# Patient Record
Sex: FEMALE | Race: BLACK OR AFRICAN AMERICAN | NOT HISPANIC OR LATINO | Employment: OTHER | URBAN - METROPOLITAN AREA
[De-identification: names, ages, dates, MRNs, and addresses within clinical notes are randomized per-mention and may not be internally consistent; named-entity substitution may affect disease eponyms.]

---

## 2017-11-02 ENCOUNTER — TELEPHONE (OUTPATIENT)
Dept: GASTROENTEROLOGY | Facility: CLINIC | Age: 67
End: 2017-11-02

## 2017-11-02 NOTE — TELEPHONE ENCOUNTER
----- Message from Mario Hillman sent at 11/2/2017 11:22 AM CDT -----  Contact: [phone] self  Patient called in requesting to speak with you. Patient prefers to speak with a nurse. Please advise.

## 2018-07-25 ENCOUNTER — CLINICAL SUPPORT (OUTPATIENT)
Dept: LAB | Facility: HOSPITAL | Age: 68
End: 2018-07-25
Attending: INTERNAL MEDICINE
Payer: MEDICARE

## 2018-07-25 DIAGNOSIS — R00.2 PALPITATIONS: ICD-10-CM

## 2018-07-25 DIAGNOSIS — R00.2 PALPITATIONS: Primary | ICD-10-CM

## 2018-07-25 PROCEDURE — 93005 ELECTROCARDIOGRAM TRACING: CPT

## 2018-09-04 DIAGNOSIS — Z12.31 SCREENING MAMMOGRAM, ENCOUNTER FOR: Primary | ICD-10-CM

## 2020-08-25 ENCOUNTER — CLINICAL SUPPORT (OUTPATIENT)
Dept: OTOLARYNGOLOGY | Facility: CLINIC | Age: 70
End: 2020-08-25
Payer: MEDICARE

## 2020-08-25 ENCOUNTER — OFFICE VISIT (OUTPATIENT)
Dept: OTOLARYNGOLOGY | Facility: CLINIC | Age: 70
End: 2020-08-25
Payer: MEDICARE

## 2020-08-25 VITALS
HEIGHT: 65 IN | BODY MASS INDEX: 41.18 KG/M2 | DIASTOLIC BLOOD PRESSURE: 86 MMHG | HEART RATE: 66 BPM | WEIGHT: 247.13 LBS | SYSTOLIC BLOOD PRESSURE: 145 MMHG | TEMPERATURE: 98 F

## 2020-08-25 DIAGNOSIS — H93.12 NEW ONSET TINNITUS, LEFT: ICD-10-CM

## 2020-08-25 PROCEDURE — 92567 TYMPANOMETRY: CPT | Mod: S$GLB,,, | Performed by: AUDIOLOGIST-HEARING AID FITTER

## 2020-08-25 PROCEDURE — 1159F PR MEDICATION LIST DOCUMENTED IN MEDICAL RECORD: ICD-10-PCS | Mod: S$GLB,,, | Performed by: OTOLARYNGOLOGY

## 2020-08-25 PROCEDURE — 3077F PR MOST RECENT SYSTOLIC BLOOD PRESSURE >= 140 MM HG: ICD-10-PCS | Mod: CPTII,S$GLB,, | Performed by: OTOLARYNGOLOGY

## 2020-08-25 PROCEDURE — 92567 PR TYMPA2METRY: ICD-10-PCS | Mod: S$GLB,,, | Performed by: AUDIOLOGIST-HEARING AID FITTER

## 2020-08-25 PROCEDURE — 3079F PR MOST RECENT DIASTOLIC BLOOD PRESSURE 80-89 MM HG: ICD-10-PCS | Mod: CPTII,S$GLB,, | Performed by: OTOLARYNGOLOGY

## 2020-08-25 PROCEDURE — 99999 PR PBB SHADOW E&M-EST. PATIENT-LVL IV: ICD-10-PCS | Mod: PBBFAC,,, | Performed by: OTOLARYNGOLOGY

## 2020-08-25 PROCEDURE — 92557 PR COMPREHENSIVE HEARING TEST: ICD-10-PCS | Mod: S$GLB,,, | Performed by: AUDIOLOGIST-HEARING AID FITTER

## 2020-08-25 PROCEDURE — 92557 COMPREHENSIVE HEARING TEST: CPT | Mod: S$GLB,,, | Performed by: AUDIOLOGIST-HEARING AID FITTER

## 2020-08-25 PROCEDURE — 99204 OFFICE O/P NEW MOD 45 MIN: CPT | Mod: S$GLB,,, | Performed by: OTOLARYNGOLOGY

## 2020-08-25 PROCEDURE — 99999 PR PBB SHADOW E&M-EST. PATIENT-LVL I: CPT | Mod: PBBFAC,,, | Performed by: AUDIOLOGIST-HEARING AID FITTER

## 2020-08-25 PROCEDURE — 3079F DIAST BP 80-89 MM HG: CPT | Mod: CPTII,S$GLB,, | Performed by: OTOLARYNGOLOGY

## 2020-08-25 PROCEDURE — 1101F PT FALLS ASSESS-DOCD LE1/YR: CPT | Mod: CPTII,S$GLB,, | Performed by: OTOLARYNGOLOGY

## 2020-08-25 PROCEDURE — 99204 PR OFFICE/OUTPT VISIT, NEW, LEVL IV, 45-59 MIN: ICD-10-PCS | Mod: S$GLB,,, | Performed by: OTOLARYNGOLOGY

## 2020-08-25 PROCEDURE — 1101F PR PT FALLS ASSESS DOC 0-1 FALLS W/OUT INJ PAST YR: ICD-10-PCS | Mod: CPTII,S$GLB,, | Performed by: OTOLARYNGOLOGY

## 2020-08-25 PROCEDURE — 3008F PR BODY MASS INDEX (BMI) DOCUMENTED: ICD-10-PCS | Mod: CPTII,S$GLB,, | Performed by: OTOLARYNGOLOGY

## 2020-08-25 PROCEDURE — 99999 PR PBB SHADOW E&M-EST. PATIENT-LVL IV: CPT | Mod: PBBFAC,,, | Performed by: OTOLARYNGOLOGY

## 2020-08-25 PROCEDURE — 3077F SYST BP >= 140 MM HG: CPT | Mod: CPTII,S$GLB,, | Performed by: OTOLARYNGOLOGY

## 2020-08-25 PROCEDURE — 1126F AMNT PAIN NOTED NONE PRSNT: CPT | Mod: S$GLB,,, | Performed by: OTOLARYNGOLOGY

## 2020-08-25 PROCEDURE — 1159F MED LIST DOCD IN RCRD: CPT | Mod: S$GLB,,, | Performed by: OTOLARYNGOLOGY

## 2020-08-25 PROCEDURE — 3008F BODY MASS INDEX DOCD: CPT | Mod: CPTII,S$GLB,, | Performed by: OTOLARYNGOLOGY

## 2020-08-25 PROCEDURE — 1126F PR PAIN SEVERITY QUANTIFIED, NO PAIN PRESENT: ICD-10-PCS | Mod: S$GLB,,, | Performed by: OTOLARYNGOLOGY

## 2020-08-25 PROCEDURE — 99999 PR PBB SHADOW E&M-EST. PATIENT-LVL I: ICD-10-PCS | Mod: PBBFAC,,, | Performed by: AUDIOLOGIST-HEARING AID FITTER

## 2020-08-25 RX ORDER — PREDNISONE 20 MG/1
TABLET ORAL
Qty: 21 TABLET | Refills: 0 | Status: SHIPPED | OUTPATIENT
Start: 2020-08-25 | End: 2020-12-07

## 2020-08-25 RX ORDER — TEMAZEPAM 15 MG/1
CAPSULE ORAL
COMMUNITY
Start: 2020-07-27 | End: 2020-12-10

## 2020-08-25 RX ORDER — HYDROCHLOROTHIAZIDE 25 MG/1
TABLET ORAL
COMMUNITY
Start: 2020-07-27 | End: 2021-02-11 | Stop reason: SDUPTHER

## 2020-08-25 NOTE — PROGRESS NOTES
Chief Complaint   Patient presents with    Tinnitus     L ear only for 10-14days   .     HPI: Mrs. Jimenez is a very pleasant 70 y.o. female here to see me today for the first time for evaluation of tinnitus.  She reports tinnitus that started suddenly 10day(s) ago.  She notes that it is primarily left. She states that the tinnitus does not interfere with communication, concentration, sleep and enjoyment of life. She denies hearing loss.  She has not noted any difference in hearing between the ears, with both ears being the better hearing ear.  She has not had any recent issues with ear pain or ear drainage.  She denies a family history of hearing loss, and has not had any previous otologic surgery.  She denies any history of significant loud noise exposure.She denies issues with dizziness.    Past Medical History:   Diagnosis Date    High cholesterol     Hypertension      Social History     Socioeconomic History    Marital status:      Spouse name: Not on file    Number of children: Not on file    Years of education: Not on file    Highest education level: Not on file   Occupational History    Not on file   Social Needs    Financial resource strain: Not on file    Food insecurity     Worry: Not on file     Inability: Not on file    Transportation needs     Medical: Not on file     Non-medical: Not on file   Tobacco Use    Smoking status: Never Smoker    Smokeless tobacco: Never Used   Substance and Sexual Activity    Alcohol use: Yes     Alcohol/week: 1.0 standard drinks     Types: 1 Glasses of wine per week     Comment: very seldom     Drug use: No    Sexual activity: Not Currently   Lifestyle    Physical activity     Days per week: Not on file     Minutes per session: Not on file    Stress: Not on file   Relationships    Social connections     Talks on phone: Not on file     Gets together: Not on file     Attends Restorationism service: Not on file     Active member of club or organization:  Not on file     Attends meetings of clubs or organizations: Not on file     Relationship status: Not on file   Other Topics Concern    Not on file   Social History Narrative    Not on file     Past Surgical History:   Procedure Laterality Date    HYSTERECTOMY       History reviewed. No pertinent family history.        Review of Systems  General: negative for chills, fever or weight loss  Psychological: negative for mood changes or depression  Ophthalmic: negative for blurry vision, photophobia or eye pain  ENT: see HPI  Respiratory: no cough, shortness of breath, or wheezing  Cardiovascular: no chest pain or dyspnea on exertion  Gastrointestinal: no abdominal pain, change in bowel habits, or black/ bloody stools  Musculoskeletal: negative for gait disturbance or muscular weakness  Neurological: no syncope or seizures; no ataxia  Dermatological: negative for puritis,  rash and jaundice  Hematologic/lymphatic: no easy bruising, no new lumps or bumps      Physical Exam:    Vitals:    08/25/20 1509   BP: (!) 145/86   Pulse: 66   Temp: 97.8 °F (36.6 °C)       Constitutional: Well appearing / communicating without difficutly.  NAD.  Eyes: EOM I Bilaterally  Head/Face: Normocephalic.  Negative paranasal sinus pressure/tenderness.  Salivary glands WNL.  House Brackmann I Bilaterally.    Right Ear: Auricle normal appearance. External Auditory Canal within normal limits no lesions or masses,TM w/o masses/lesions/perforations. TM mobility noted.   Left Ear: Auricle normal appearance. External Auditory Canal within normal limits no lesions or masses,TM w/o masses/lesions/perforations. TM mobility noted.  Nose: No gross nasal septal deviation. Inferior Turbinates 3+ bilaterally. No septal perforation. No masses/lesions. External nasal skin appears normal without masses/lesions.  Oral Cavity: Gingiva/lips within normal limits.  Dentition/gingiva healthy appearing. Mucus membranes moist. Floor of mouth soft, no masses palpated.  Oral Tongue mobile. Hard Palate appears normal.    Oropharynx: Base of tongue appears normal. No masses/lesions noted. Tonsillar fossa/pharyngeal wall without lesions. Posterior oropharynx WNL.  Soft palate without masses. Midline uvula.   Neck/Lymphatic: No LAD I-VI bilaterally.  No thyromegaly.  No masses noted on exam.    Mirror laryngoscopy/nasopharyngoscopy: Active gag reflex.  Unable to perform.    Neuro/Psychiatric: AOx3.  Normal mood and affect.   Cardiovascular: Normal carotid pulses bilaterally, no increasing jugular venous distention noted at cervical region bilaterally.    Respiratory: Normal respiratory effort, no stridor, no retractions noted.                Assessment:    ICD-10-CM ICD-9-CM    1. Asymmetrical hearing loss of both ears  H91.8X3 389.8    2. New onset tinnitus, left  H93.12 388.30      The primary encounter diagnosis was Asymmetrical hearing loss of both ears. A diagnosis of New onset tinnitus, left was also pertinent to this visit.      Plan:  No orders of the defined types were placed in this encounter.    I explained that the hearing loss may be a manifestation of SSNHL given asymmetry on audiogram. We discussed the relevant pathology and anatomy of sudden sensorineural hearing loss (SSNHL), and that it is usually a viral sequelae.  The current treatment recommendation is prompt treatment with high dose oral steroids, Prednisone 60-40-20-10 over two weeks.  Risks of steroid use, including elevation of blood sugar, were discussed with the patient who expressed understanding.  I will see the patient back in two weeks with a repeat audiogram.      Susan Littlejohn MD

## 2020-09-03 NOTE — PROGRESS NOTES
Chief Complaint   Patient presents with    Follow-up    Tinnitus     off and on since last visit    Dizziness     medicine gave her some dizzy spells and nausea   .     HPI: Mrs. Jimenez is a very pleasant 70 y.o. female here to see me today for the first time for evaluation of tinnitus.  She reports tinnitus that started suddenly 10day(s) ago.  She notes that it is primarily left. She states that the tinnitus does not interfere with communication, concentration, sleep and enjoyment of life. She denies hearing loss.  She has not noted any difference in hearing between the ears, with both ears being the better hearing ear.  She has not had any recent issues with ear pain or ear drainage.  She denies a family history of hearing loss, and has not had any previous otologic surgery.  She denies any history of significant loud noise exposure.She denies issues with dizziness.    Interval HPI 09/03/2020:  Mrs. Jimenez follows up today for acute onset left-sided tinnitus and presumed sudden sensorineural hearing loss.  She states that since last visit she has been taking the prednisone tablets as prescribed.  She feels that the tinnitus is still present but slightly decreased.  She reports that the hearing may be slightly improved.  She denies any otalgia, otorrhea, postauricular pain, or vertigo.    Past Medical History:   Diagnosis Date    High cholesterol     Hypertension      Social History     Socioeconomic History    Marital status:      Spouse name: Not on file    Number of children: Not on file    Years of education: Not on file    Highest education level: Not on file   Occupational History    Not on file   Social Needs    Financial resource strain: Not on file    Food insecurity     Worry: Not on file     Inability: Not on file    Transportation needs     Medical: Not on file     Non-medical: Not on file   Tobacco Use    Smoking status: Never Smoker    Smokeless tobacco: Never Used   Substance  and Sexual Activity    Alcohol use: Yes     Alcohol/week: 1.0 standard drinks     Types: 1 Glasses of wine per week     Comment: very seldom     Drug use: No    Sexual activity: Not Currently   Lifestyle    Physical activity     Days per week: Not on file     Minutes per session: Not on file    Stress: Not on file   Relationships    Social connections     Talks on phone: Not on file     Gets together: Not on file     Attends Mu-ism service: Not on file     Active member of club or organization: Not on file     Attends meetings of clubs or organizations: Not on file     Relationship status: Not on file   Other Topics Concern    Not on file   Social History Narrative    Not on file     Past Surgical History:   Procedure Laterality Date    HYSTERECTOMY       No family history on file.        Review of Systems  General: negative for chills, fever or weight loss  Psychological: negative for mood changes or depression  Ophthalmic: negative for blurry vision, photophobia or eye pain  ENT: see HPI  Respiratory: no cough, shortness of breath, or wheezing  Cardiovascular: no chest pain or dyspnea on exertion  Gastrointestinal: no abdominal pain, change in bowel habits, or black/ bloody stools  Musculoskeletal: negative for gait disturbance or muscular weakness  Neurological: no syncope or seizures; no ataxia  Dermatological: negative for puritis,  rash and jaundice  Hematologic/lymphatic: no easy bruising, no new lumps or bumps      Physical Exam:    Vitals:    09/04/20 1058   BP: (!) 156/89   Pulse: 60   Temp: 97.4 °F (36.3 °C)       Constitutional: Well appearing / communicating without difficutly.  NAD.  Eyes: EOM I Bilaterally  Head/Face: Normocephalic.  Negative paranasal sinus pressure/tenderness.  Salivary glands WNL.  House Brackmann I Bilaterally.    Right Ear: Auricle normal appearance. External Auditory Canal within normal limits no lesions or masses,TM w/o masses/lesions/perforations. TM mobility  noted.   Left Ear: Auricle normal appearance. External Auditory Canal within normal limits no lesions or masses,TM w/o masses/lesions/perforations. TM mobility noted.  Nose: No gross nasal septal deviation. Inferior Turbinates 3+ bilaterally. No septal perforation. No masses/lesions. External nasal skin appears normal without masses/lesions.  Oral Cavity: Gingiva/lips within normal limits.  Dentition/gingiva healthy appearing. Mucus membranes moist. Floor of mouth soft, no masses palpated. Oral Tongue mobile. Hard Palate appears normal.    Oropharynx: Base of tongue appears normal. No masses/lesions noted. Tonsillar fossa/pharyngeal wall without lesions. Posterior oropharynx WNL.  Soft palate without masses. Midline uvula.   Neck/Lymphatic: No LAD I-VI bilaterally.  No thyromegaly.  No masses noted on exam.    Mirror laryngoscopy/nasopharyngoscopy: Active gag reflex.  Unable to perform.    Neuro/Psychiatric: AOx3.  Normal mood and affect.   Cardiovascular: Normal carotid pulses bilaterally, no increasing jugular venous distention noted at cervical region bilaterally.    Respiratory: Normal respiratory effort, no stridor, no retractions noted.      Diagnostic testing reviewed:   Audiogram reviewed personally by myself and in detail with the patient today.                 Assessment:    ICD-10-CM ICD-9-CM    1. Asymmetrical hearing loss of both ears  H91.8X3 389.8    2. New onset tinnitus, left  H93.12 388.30      The primary encounter diagnosis was Asymmetrical hearing loss of both ears. A diagnosis of New onset tinnitus, left was also pertinent to this visit.      Plan:  No orders of the defined types were placed in this encounter.    Repeat audiogram today shows improvement in all frequencies.  Her threshold levels seem to be back to the level of the right ear with the exception of 4000 and 8000 hz.   Reassurance provided that she did receive benefit from PO steroid regimen. She is still concerned about the  tinnitus that is more prominent in her left ear.   We also discussed that tinnitus is most often caused by a hearing loss, and that as the hair cells are damaged, either genetic or as a result of loud noise exposure, they then cause tinnitus.  Some patients find that restricting the salt or caffeine in their diet helps.  Tinnitus tends to be louder in times of stress and fatigue, and may decrease with time.  Sound machines may also be an effective masking technique if needed at night.      Susan Littlejohn MD

## 2020-09-04 ENCOUNTER — CLINICAL SUPPORT (OUTPATIENT)
Dept: OTOLARYNGOLOGY | Facility: CLINIC | Age: 70
End: 2020-09-04
Payer: MEDICARE

## 2020-09-04 ENCOUNTER — OFFICE VISIT (OUTPATIENT)
Dept: OTOLARYNGOLOGY | Facility: CLINIC | Age: 70
End: 2020-09-04
Payer: MEDICARE

## 2020-09-04 VITALS
WEIGHT: 247.44 LBS | HEART RATE: 60 BPM | BODY MASS INDEX: 41.23 KG/M2 | HEIGHT: 65 IN | TEMPERATURE: 97 F | SYSTOLIC BLOOD PRESSURE: 156 MMHG | DIASTOLIC BLOOD PRESSURE: 89 MMHG

## 2020-09-04 DIAGNOSIS — H93.12 NEW ONSET TINNITUS, LEFT: ICD-10-CM

## 2020-09-04 PROCEDURE — 1126F PR PAIN SEVERITY QUANTIFIED, NO PAIN PRESENT: ICD-10-PCS | Mod: S$GLB,,, | Performed by: OTOLARYNGOLOGY

## 2020-09-04 PROCEDURE — 1101F PT FALLS ASSESS-DOCD LE1/YR: CPT | Mod: CPTII,S$GLB,, | Performed by: OTOLARYNGOLOGY

## 2020-09-04 PROCEDURE — 99213 OFFICE O/P EST LOW 20 MIN: CPT | Mod: S$GLB,,, | Performed by: OTOLARYNGOLOGY

## 2020-09-04 PROCEDURE — 99999 PR PBB SHADOW E&M-EST. PATIENT-LVL I: CPT | Mod: PBBFAC,,, | Performed by: AUDIOLOGIST-HEARING AID FITTER

## 2020-09-04 PROCEDURE — 99999 PR PBB SHADOW E&M-EST. PATIENT-LVL III: ICD-10-PCS | Mod: PBBFAC,,, | Performed by: OTOLARYNGOLOGY

## 2020-09-04 PROCEDURE — 1126F AMNT PAIN NOTED NONE PRSNT: CPT | Mod: S$GLB,,, | Performed by: OTOLARYNGOLOGY

## 2020-09-04 PROCEDURE — 92553 AUDIOMETRY AIR & BONE: CPT | Mod: S$GLB,,, | Performed by: AUDIOLOGIST-HEARING AID FITTER

## 2020-09-04 PROCEDURE — 3008F BODY MASS INDEX DOCD: CPT | Mod: CPTII,S$GLB,, | Performed by: OTOLARYNGOLOGY

## 2020-09-04 PROCEDURE — 92553 PR AUDIOMETRY, AIR & BONE: ICD-10-PCS | Mod: S$GLB,,, | Performed by: AUDIOLOGIST-HEARING AID FITTER

## 2020-09-04 PROCEDURE — 1101F PR PT FALLS ASSESS DOC 0-1 FALLS W/OUT INJ PAST YR: ICD-10-PCS | Mod: CPTII,S$GLB,, | Performed by: OTOLARYNGOLOGY

## 2020-09-04 PROCEDURE — 1159F MED LIST DOCD IN RCRD: CPT | Mod: S$GLB,,, | Performed by: OTOLARYNGOLOGY

## 2020-09-04 PROCEDURE — 3008F PR BODY MASS INDEX (BMI) DOCUMENTED: ICD-10-PCS | Mod: CPTII,S$GLB,, | Performed by: OTOLARYNGOLOGY

## 2020-09-04 PROCEDURE — 99999 PR PBB SHADOW E&M-EST. PATIENT-LVL I: ICD-10-PCS | Mod: PBBFAC,,, | Performed by: AUDIOLOGIST-HEARING AID FITTER

## 2020-09-04 PROCEDURE — 99213 PR OFFICE/OUTPT VISIT, EST, LEVL III, 20-29 MIN: ICD-10-PCS | Mod: S$GLB,,, | Performed by: OTOLARYNGOLOGY

## 2020-09-04 PROCEDURE — 3077F PR MOST RECENT SYSTOLIC BLOOD PRESSURE >= 140 MM HG: ICD-10-PCS | Mod: CPTII,S$GLB,, | Performed by: OTOLARYNGOLOGY

## 2020-09-04 PROCEDURE — 99999 PR PBB SHADOW E&M-EST. PATIENT-LVL III: CPT | Mod: PBBFAC,,, | Performed by: OTOLARYNGOLOGY

## 2020-09-04 PROCEDURE — 3077F SYST BP >= 140 MM HG: CPT | Mod: CPTII,S$GLB,, | Performed by: OTOLARYNGOLOGY

## 2020-09-04 PROCEDURE — 3079F DIAST BP 80-89 MM HG: CPT | Mod: CPTII,S$GLB,, | Performed by: OTOLARYNGOLOGY

## 2020-09-04 PROCEDURE — 1159F PR MEDICATION LIST DOCUMENTED IN MEDICAL RECORD: ICD-10-PCS | Mod: S$GLB,,, | Performed by: OTOLARYNGOLOGY

## 2020-09-04 PROCEDURE — 3079F PR MOST RECENT DIASTOLIC BLOOD PRESSURE 80-89 MM HG: ICD-10-PCS | Mod: CPTII,S$GLB,, | Performed by: OTOLARYNGOLOGY

## 2020-09-25 ENCOUNTER — TELEPHONE (OUTPATIENT)
Dept: OTOLARYNGOLOGY | Facility: CLINIC | Age: 70
End: 2020-09-25

## 2020-09-29 ENCOUNTER — OFFICE VISIT (OUTPATIENT)
Dept: OTOLARYNGOLOGY | Facility: CLINIC | Age: 70
End: 2020-09-29
Payer: MEDICARE

## 2020-09-29 ENCOUNTER — CLINICAL SUPPORT (OUTPATIENT)
Dept: AUDIOLOGY | Facility: CLINIC | Age: 70
End: 2020-09-29
Payer: MEDICARE

## 2020-09-29 VITALS — BODY MASS INDEX: 40.94 KG/M2 | WEIGHT: 246 LBS

## 2020-09-29 DIAGNOSIS — H90.42 SENSORINEURAL HEARING LOSS (SNHL) OF LEFT EAR WITH UNRESTRICTED HEARING OF RIGHT EAR: ICD-10-CM

## 2020-09-29 DIAGNOSIS — H93.12 NEW ONSET TINNITUS, LEFT: Primary | ICD-10-CM

## 2020-09-29 DIAGNOSIS — H90.A22 SENSORINEURAL HEARING LOSS (SNHL) OF LEFT EAR WITH RESTRICTED HEARING OF RIGHT EAR: Primary | ICD-10-CM

## 2020-09-29 PROCEDURE — 99999 PR PBB SHADOW E&M-EST. PATIENT-LVL III: CPT | Mod: PBBFAC,,, | Performed by: OTOLARYNGOLOGY

## 2020-09-29 PROCEDURE — 99999 PR PBB SHADOW E&M-EST. PATIENT-LVL III: ICD-10-PCS | Mod: PBBFAC,,, | Performed by: OTOLARYNGOLOGY

## 2020-09-29 PROCEDURE — 3008F BODY MASS INDEX DOCD: CPT | Mod: CPTII,S$GLB,, | Performed by: OTOLARYNGOLOGY

## 2020-09-29 PROCEDURE — 92557 COMPREHENSIVE HEARING TEST: CPT | Mod: S$GLB,,, | Performed by: AUDIOLOGIST

## 2020-09-29 PROCEDURE — 1126F AMNT PAIN NOTED NONE PRSNT: CPT | Mod: S$GLB,,, | Performed by: OTOLARYNGOLOGY

## 2020-09-29 PROCEDURE — 99214 OFFICE O/P EST MOD 30 MIN: CPT | Mod: S$GLB,,, | Performed by: OTOLARYNGOLOGY

## 2020-09-29 PROCEDURE — 92567 PR TYMPA2METRY: ICD-10-PCS | Mod: S$GLB,,, | Performed by: AUDIOLOGIST

## 2020-09-29 PROCEDURE — 1101F PR PT FALLS ASSESS DOC 0-1 FALLS W/OUT INJ PAST YR: ICD-10-PCS | Mod: CPTII,S$GLB,, | Performed by: OTOLARYNGOLOGY

## 2020-09-29 PROCEDURE — 1159F PR MEDICATION LIST DOCUMENTED IN MEDICAL RECORD: ICD-10-PCS | Mod: S$GLB,,, | Performed by: OTOLARYNGOLOGY

## 2020-09-29 PROCEDURE — 92557 PR COMPREHENSIVE HEARING TEST: ICD-10-PCS | Mod: S$GLB,,, | Performed by: AUDIOLOGIST

## 2020-09-29 PROCEDURE — 1159F MED LIST DOCD IN RCRD: CPT | Mod: S$GLB,,, | Performed by: OTOLARYNGOLOGY

## 2020-09-29 PROCEDURE — 3008F PR BODY MASS INDEX (BMI) DOCUMENTED: ICD-10-PCS | Mod: CPTII,S$GLB,, | Performed by: OTOLARYNGOLOGY

## 2020-09-29 PROCEDURE — 92567 TYMPANOMETRY: CPT | Mod: S$GLB,,, | Performed by: AUDIOLOGIST

## 2020-09-29 PROCEDURE — 1126F PR PAIN SEVERITY QUANTIFIED, NO PAIN PRESENT: ICD-10-PCS | Mod: S$GLB,,, | Performed by: OTOLARYNGOLOGY

## 2020-09-29 PROCEDURE — 99214 PR OFFICE/OUTPT VISIT, EST, LEVL IV, 30-39 MIN: ICD-10-PCS | Mod: S$GLB,,, | Performed by: OTOLARYNGOLOGY

## 2020-09-29 PROCEDURE — 1101F PT FALLS ASSESS-DOCD LE1/YR: CPT | Mod: CPTII,S$GLB,, | Performed by: OTOLARYNGOLOGY

## 2020-09-29 NOTE — PROGRESS NOTES
Ludmila Jimenez was seen today in the clinic for an audiologic evaluation.  Patients main complaint was tinnitus and hearing loss in her left ear.  Mrs. Jimenez reported that she feels the tinnitus is slightly better, but still present.    Tympanometry revealed Type A in the right ear and Type A in the left ear.  Audiogram results revealed normal sloping to mild sensorineural hearing loss (SNHL) in the right ear and normal sloping to moderate SNHL with a conductive component at 4000 Hz in the left ear.  Speech reception thresholds were noted at 15 dB in the right ear and 25 dB in the left ear.  Speech discrimination scores were 96% in the right ear and 92% in the left ear.    Recommendations:  1. Otologic evaluation  2. Annual audiogram  3. Noise protection when in noise

## 2020-09-29 NOTE — LETTER
September 30, 2020      Susan Littlejohn MD  [address]  [address]  [place] [address]           [facility]  [address]  [facility] [address]  Phone: [phone]  Fax: [phone]          Patient: Ludmila Jimenez   MR Number: [id number]   YOB: 1950   Date of Visit: 9/29/2020       Dear Dr. Susan Littlejohn:    Thank you for referring Ludmila Jimenez to me for evaluation. Attached you will find relevant portions of my assessment and plan of care.    If you have questions, please do not hesitate to call me. I look forward to following uLdmila Jimenez along with you.    Sincerely,    Gianni Maloney MD    Enclosure  CC:  No Recipients    If you would like to receive this communication electronically, please contact [email or web address] or [phone] to request more information on [facility] access.    For providers and/or their staff who would like to refer a patient to [facility], please contact us through our one-stop-shop provider referral line, [facility], at [phone].    If you feel you have received this communication in error or would no longer like to receive these types of communications, please e-mail [email or web address]

## 2020-09-30 NOTE — PROGRESS NOTES
Subjective:       Patient ID: Ludmila Jimenez is a 70 y.o. female.    Chief Complaint: Hearing Loss    HPI     Ludmila Jimenez is a 70 y.o. female with history of left-sided mild sudden sensorineural hearing loss occurring approximately 5 weeks ago.  She was treated with oral steroids and noted some mild improvement.  She continues to have left-sided tinnitus that is constant and mild-to-moderate.  She does not perceive significant hearing loss associated with it.  She denies any vertigo.      Review of Systems   Constitutional: Negative for chills and fever.   HENT: Negative for sore throat and trouble swallowing.    Respiratory: Negative for apnea and chest tightness.    Cardiovascular: Negative for chest pain.         Objective:      Physical Exam  Constitutional:       Appearance: She is well-developed.   HENT:      Head: Normocephalic and atraumatic.      Right Ear: Tympanic membrane, ear canal and external ear normal.      Left Ear: Tympanic membrane, ear canal and external ear normal.      Nose: Nose normal.      Mouth/Throat:      Pharynx: Uvula midline.   Neck:      Musculoskeletal: Normal range of motion.      Thyroid: No thyroid mass.           Data:    Audiogram tracings independently reviewed and discussed with patient shows mild left-sided sensorineural hearing loss          Assessment:       1. New onset tinnitus, left    2. Sensorineural hearing loss (SNHL) of left ear with unrestricted hearing of right ear    3. Asymmetrical hearing loss of both ears        Plan:         in summary this 70-year-old female with history of sudden hearing loss 5 weeks ago.  She had some improvement with oral steroids but has residual tinnitus.  As her hearing loss is so mild do not recommend transtympanic injection of dexamethasone at this time.    Do recommend MRI of internal auditory canals to rule out acoustic neuroma.    Recommend recheck hearing in 6 months to 1 year.

## 2020-10-04 ENCOUNTER — HOSPITAL ENCOUNTER (OUTPATIENT)
Dept: RADIOLOGY | Facility: HOSPITAL | Age: 70
Discharge: HOME | End: 2020-10-04
Attending: OTOLARYNGOLOGY
Payer: MEDICARE

## 2020-10-04 DIAGNOSIS — H90.42 SENSORINEURAL HEARING LOSS (SNHL) OF LEFT EAR WITH UNRESTRICTED HEARING OF RIGHT EAR: ICD-10-CM

## 2020-10-04 LAB
CREAT SERPL-MCNC: 0.8 MG/DL (ref 0.5–1.4)
SAMPLE: NORMAL

## 2020-10-04 PROCEDURE — 70553 MRI IAC/TEMPORAL BONES W W/O CONTRAST: ICD-10-PCS | Mod: 26,,, | Performed by: RADIOLOGY

## 2020-10-04 PROCEDURE — 70553 MRI BRAIN STEM W/O & W/DYE: CPT | Mod: 26,,, | Performed by: RADIOLOGY

## 2020-10-04 PROCEDURE — 25500020 PHARM REV CODE 255: Performed by: OTOLARYNGOLOGY

## 2020-10-04 PROCEDURE — 70553 MRI BRAIN STEM W/O & W/DYE: CPT | Mod: TC

## 2020-10-04 PROCEDURE — A9585 GADOBUTROL INJECTION: HCPCS | Performed by: OTOLARYNGOLOGY

## 2020-10-04 RX ORDER — GADOBUTROL 604.72 MG/ML
10 INJECTION INTRAVENOUS
Status: COMPLETED | OUTPATIENT
Start: 2020-10-04 | End: 2020-10-04

## 2020-10-04 RX ADMIN — GADOBUTROL 10 ML: 604.72 INJECTION INTRAVENOUS at 09:10

## 2020-10-08 ENCOUNTER — TELEPHONE (OUTPATIENT)
Dept: OTOLARYNGOLOGY | Facility: CLINIC | Age: 70
End: 2020-10-08

## 2020-10-08 NOTE — TELEPHONE ENCOUNTER
----- Message from Gianni Maloney MD sent at 10/8/2020  9:23 AM CDT -----  Please let her know the MRI was normal.  If she has any specific questions regarding the radiologist report I can give her a call.      AM  ----- Message -----  From: Claudia Smith MA  Sent: 10/8/2020   9:13 AM CDT  To: Gianni Maolney MD    Please advise  ----- Message -----  From: Aureliano Khan  Sent: 10/8/2020   9:05 AM CDT  To: Master Gianni Staff    Patient called to speak w/ someone regarding the results of her MRI from 10/4, requesting callback [phone]

## 2020-11-28 ENCOUNTER — OFFICE VISIT (OUTPATIENT)
Dept: URGENT CARE | Facility: CLINIC | Age: 70
End: 2020-11-28
Payer: MEDICARE

## 2020-11-28 VITALS
HEIGHT: 65 IN | WEIGHT: 245 LBS | BODY MASS INDEX: 40.82 KG/M2 | TEMPERATURE: 98 F | HEART RATE: 79 BPM | DIASTOLIC BLOOD PRESSURE: 81 MMHG | RESPIRATION RATE: 20 BRPM | SYSTOLIC BLOOD PRESSURE: 149 MMHG | OXYGEN SATURATION: 98 %

## 2020-11-28 DIAGNOSIS — M17.11 PRIMARY OSTEOARTHRITIS OF RIGHT KNEE: Primary | ICD-10-CM

## 2020-11-28 PROCEDURE — 3008F BODY MASS INDEX DOCD: CPT | Mod: CPTII,S$GLB,, | Performed by: INTERNAL MEDICINE

## 2020-11-28 PROCEDURE — 96372 THER/PROPH/DIAG INJ SC/IM: CPT | Mod: S$GLB,,, | Performed by: INTERNAL MEDICINE

## 2020-11-28 PROCEDURE — 3008F PR BODY MASS INDEX (BMI) DOCUMENTED: ICD-10-PCS | Mod: CPTII,S$GLB,, | Performed by: INTERNAL MEDICINE

## 2020-11-28 PROCEDURE — 99214 PR OFFICE/OUTPT VISIT, EST, LEVL IV, 30-39 MIN: ICD-10-PCS | Mod: 25,S$GLB,, | Performed by: INTERNAL MEDICINE

## 2020-11-28 PROCEDURE — 96372 PR INJECTION,THERAP/PROPH/DIAG2ST, IM OR SUBCUT: ICD-10-PCS | Mod: S$GLB,,, | Performed by: INTERNAL MEDICINE

## 2020-11-28 PROCEDURE — 99214 OFFICE O/P EST MOD 30 MIN: CPT | Mod: 25,S$GLB,, | Performed by: INTERNAL MEDICINE

## 2020-11-28 PROCEDURE — 73562 XR KNEE 3 VIEW RIGHT: ICD-10-PCS | Mod: FY,RT,S$GLB, | Performed by: RADIOLOGY

## 2020-11-28 PROCEDURE — 73562 X-RAY EXAM OF KNEE 3: CPT | Mod: FY,RT,S$GLB, | Performed by: RADIOLOGY

## 2020-11-28 RX ORDER — NAPROXEN 500 MG/1
500 TABLET ORAL 2 TIMES DAILY WITH MEALS
Qty: 28 TABLET | Refills: 0 | Status: SHIPPED | OUTPATIENT
Start: 2020-11-28 | End: 2020-12-07

## 2020-11-28 RX ORDER — DICLOFENAC SODIUM 10 MG/G
2 GEL TOPICAL DAILY
Qty: 50 G | Refills: 0 | Status: SHIPPED | OUTPATIENT
Start: 2020-11-28 | End: 2023-05-03

## 2020-11-28 RX ORDER — KETOROLAC TROMETHAMINE 30 MG/ML
30 INJECTION, SOLUTION INTRAMUSCULAR; INTRAVENOUS
Status: COMPLETED | OUTPATIENT
Start: 2020-11-28 | End: 2020-11-28

## 2020-11-28 RX ADMIN — KETOROLAC TROMETHAMINE 30 MG: 30 INJECTION, SOLUTION INTRAMUSCULAR; INTRAVENOUS at 10:11

## 2020-11-28 NOTE — PATIENT INSTRUCTIONS
Do not take Naprosyn with other NSAID medications such as ibuprofen, Advil, Goody's powder.       Osteoarthritis Medicine    Pain from osteoarthritis can interfere with your life in many ways. It can make it hard to be active and take good care of yourself. Untreated pain may make sleep difficult. It may also contribute to depression and anxiety.  Controlling pain involves lifestyle changes like weight management and exercise. Natural and alternative treatments for pain relief include the use of hot and cold, massage, acupuncture, relaxation, and counseling. Other medicines are available to help relieve pain.  Over-the-counter medicines  Some arthritis medicines can be bought without a prescription:  · Acetaminophen is effective for moderate pain and does not cause stomach upset. It doesnt relieve swelling, though. You must talk with your healthcare provider before taking it if you have liver or kidney problems.   · Nonsteroidal anti-inflammatory medicines (NSAIDs), such as aspirin, ibuprofen, or naproxen, help relieve pain and swelling. Use of NSAIDs can cause stomach and kidney problems and raise blood pressure. Note: Do not take NSAIDs if you take medicines that thin your blood, such as warfarin. Talk to your healthcare provider first if you have kidney or liver disease.   Prescription medicines  Some arthritis medicines need a prescription:  · Prescription NSAIDs are stronger than over-the-counter NSAIDs. They reduce pain and swelling. Use of NSAIDs may cause serious stomach problems and easy bruising. In rare cases they may lead to kidney or liver problems. These include nonselective NSAIDs, such as naproxen, diclofenac, and indomethacin. Also, selective NSAIDs, such as meloxicam and celecoxib. Selective NSAIDs are thought to have less of a risk of gastrointestinal side effects when compared to nonselective NSAIDs.   · Other medicines, such as duloxetine, tramadol, and narcotic pain relievers, may be  prescribed for certain patients based on specific clinical factors.   Topical medicines  Topical medicines are those applied directly to the skin over the affected joint. They may be lotions, cream, sprays, ointments, or gels. They can be used along with some oral medicines:  · NSAID creams may reduce swelling and relieve pain.  · Capsaicin (cream) is made from an ingredient found in chili peppers. It works by stopping production of a substance that helps send pain signals to the brain. It may cause a burning or stinging feeling when you first use it.  · Other topical medicines provide pain relief by numbing the area to which they are applied.  Intra-articular medicines  Some patients benefit from joint injections with:  · Corticosteroids used for most joints  · Hyaluronic acid preparation used for knees   If one medicine doesn't work for you, another may help. If you have any questions or concerns about your current medicines or other medicines choices, talk with your healthcare provider.  Date Last Reviewed: 2/14/2016  © 9712-2213 [employer]. [address] [place] [address], [place] [address]. All rights reserved. This information is not intended as a substitute for professional medical care. Always follow your healthcare professional's instructions.

## 2020-11-28 NOTE — PROGRESS NOTES
"Subjective:       Patient ID: Ludmila Jimenez is a 70 y.o. female.    Vitals:  height is 5' 5" (1.651 m) and weight is 111.1 kg (245 lb). Her temporal temperature is 97.6 °F (36.4 °C). Her blood pressure is 149/81 (abnormal) and her pulse is 79. Her respiration is 20 and oxygen saturation is 98%.     Chief Complaint: Knee Pain    Patient c/o of right knee pain. Having progressive worsening knee pain for past few months. She has an appointment with orthopedics on the 7th of December for this problem.     Knee Pain   The incident occurred more than 1 week ago. There was no injury mechanism. The pain is present in the right knee. The patient is experiencing no pain. Associated symptoms include an inability to bear weight. She has tried acetaminophen, NSAIDs, heat and ice for the symptoms. The treatment provided mild relief.       Constitution: Negative for chills, fatigue and fever.   HENT: Negative for congestion and sore throat.    Neck: Negative for painful lymph nodes.   Cardiovascular: Negative for chest pain and leg swelling.   Eyes: Negative for double vision and blurred vision.   Respiratory: Negative for cough and shortness of breath.    Gastrointestinal: Negative for nausea, vomiting and diarrhea.   Genitourinary: Negative for dysuria, frequency, urgency and history of kidney stones.   Musculoskeletal: Positive for joint pain and joint swelling. Negative for muscle cramps and muscle ache.   Skin: Negative for color change, pale, rash, erythema and bruising.   Allergic/Immunologic: Negative for seasonal allergies.   Neurological: Negative for dizziness, history of vertigo, light-headedness, passing out and headaches.   Hematologic/Lymphatic: Negative for swollen lymph nodes.   Psychiatric/Behavioral: Negative for nervous/anxious, sleep disturbance and depression. The patient is not nervous/anxious.        Objective:      Physical Exam   Constitutional: She is oriented to person, place, and time. She " appears well-developed. No distress.   HENT:   Head: Normocephalic and atraumatic.   Ears:   Right Ear: External ear normal.   Left Ear: External ear normal.   Nose: Nose normal.   Mouth/Throat: Oropharynx is clear and moist. No oropharyngeal exudate.   Eyes: Pupils are equal, round, and reactive to light. Conjunctivae and EOM are normal. Right eye exhibits no discharge. Left eye exhibits no discharge. No scleral icterus.   Neck: Normal range of motion. Neck supple.   Cardiovascular: Normal rate, regular rhythm and normal heart sounds. Exam reveals no gallop and no friction rub.   No murmur heard.  Pulmonary/Chest: Effort normal. No respiratory distress. She has no wheezes. She has no rales.   Abdominal: Soft. Bowel sounds are normal. She exhibits no distension.   Musculoskeletal:      Right knee: She exhibits swelling and effusion. She exhibits normal range of motion, no ecchymosis, no deformity, no laceration, no erythema, normal alignment, no LCL laxity and normal patellar mobility.      Left knee: She exhibits swelling and effusion.   Lymphadenopathy:     She has no cervical adenopathy.   Neurological: She is alert and oriented to person, place, and time.   Skin: Skin is warm, dry, not diaphoretic and no rash. Capillary refill takes less than 2 seconds. not right kneeerythemaPsychiatric: Her behavior is normal.   Nursing note and vitals reviewed.        Assessment:       1. Primary osteoarthritis of right knee        Plan:         Primary osteoarthritis of right knee  -     XR KNEE 3 VIEW RIGHT; Future; Expected date: 11/28/2020  -     diclofenac sodium (VOLTAREN ARTHRITIS PAIN) 1 % Gel; Apply 2 g topically once daily.  Dispense: 50 g; Refill: 0  -     ketorolac injection 30 mg  -     naproxen (NAPROSYN) 500 MG tablet; Take 1 tablet (500 mg total) by mouth 2 (two) times daily with meals. for 14 days  Dispense: 28 tablet; Refill: 0  -     Ambulatory referral/consult to Physical/Occupational Therapy    Symptoms  consistent with OA flare. Regimen as above. Orthopedics will be seeing her on the 7th of December for this problem. Home care tips provided.

## 2020-12-01 ENCOUNTER — TELEPHONE (OUTPATIENT)
Dept: URGENT CARE | Facility: CLINIC | Age: 70
End: 2020-12-01

## 2020-12-01 NOTE — PROGRESS NOTES
Subjective:      Patient ID: Ludmila Jimenez is a 70 y.o. female.    Chief Complaint: No chief complaint on file.       HPI  (Rodri)    Seen by  on 11/28/20 for right knee pain.     She has intermittent right knee pain that is more medial. Pain is worse with standing and walking. Better with rest. She rates her pain as a 3 on a scale of 1-10. Pain is aching with intermittent sharpness. Some pain at night. She has intermittent swelling. No left knee pain.    She was given voltaren gel and naproxen at . She stopped naproxen.  Voltaren gel is helping. She takes prn OTC tylenol with relief. No bracing, PT, injections, or surgery on her knees.       Past Medical History:   Diagnosis Date    High cholesterol     Hypertension          Current Outpatient Medications:     aspirin (ECOTRIN) 81 MG EC tablet, Take 81 mg by mouth once daily., Disp: , Rfl:     atorvastatin (LIPITOR) 10 MG tablet, Take 10 mg by mouth once daily., Disp: , Rfl:     diclofenac sodium (VOLTAREN ARTHRITIS PAIN) 1 % Gel, Apply 2 g topically once daily., Disp: 50 g, Rfl: 0    hydroCHLOROthiazide (HYDRODIURIL) 25 MG tablet, , Disp: , Rfl:     metoprolol succinate (TOPROL-XL) 50 MG 24 hr tablet, TAKE 1 TABLET EVERY DAY, Disp: 90 tablet, Rfl: 3    MULTIVIT-IRON-MIN-FOLIC ACID 3,500-18-0.4 UNIT-MG-MG ORAL CHEW, Take by mouth., Disp: , Rfl:     temazepam (RESTORIL) 15 mg Cap, , Disp: , Rfl:     Review of patient's allergies indicates:  No Known Allergies    Review of Systems   Constitution: Negative for fever, malaise/fatigue, night sweats, weight gain and weight loss.   HENT: Negative for hearing loss, nosebleeds and odynophagia.    Eyes: Negative for blurred vision and double vision.   Cardiovascular: Negative for chest pain, irregular heartbeat and palpitations.   Respiratory: Negative for cough, hemoptysis, shortness of breath and wheezing.    Endocrine: Negative for cold intolerance and polydipsia.   Hematologic/Lymphatic: Does not  bruise/bleed easily.   Skin: Negative for dry skin, poor wound healing, rash and suspicious lesions.   Musculoskeletal:        See HPI for pertinent positives.   Gastrointestinal: Negative for bloating, abdominal pain, constipation, diarrhea, hematochezia, melena, nausea and vomiting.   Genitourinary: Negative for bladder incontinence, dysuria, hematuria, hesitancy and incomplete emptying.   Neurological: Negative for disturbances in coordination, dizziness, focal weakness, headaches, loss of balance, numbness, paresthesias, seizures and weakness.   Psychiatric/Behavioral: Negative for depression and hallucinations. The patient is not nervous/anxious.          Objective:        /74   Pulse 80   Resp 16   Wt 112 kg (247 lb)   BMI 41.10 kg/m²     General    Vitals reviewed.  Constitutional: She is oriented to person, place, and time. She appears well-developed and well-nourished.   Pulmonary/Chest: Effort normal.   Abdominal: She exhibits no distension.   Neurological: She is alert and oriented to person, place, and time.   Psychiatric: She has a normal mood and affect. Her behavior is normal. Judgment and thought content normal.           Body habitus is obese.   The patient walks with a limp.      RIGHT KNEE EXAM:    Resisted SLR negative.   The skin over the knee is intact.  Knee effusion not significant   Tendernes is located medial  Range of motion- Flexion 120 deg, Extension 0 deg,     Ligament exam:   MCL intact   Lachman intact              Post sag intact    LCL intact    Patellar apprehension negative.  Popliteal cyst negative  Patellar crepitation present.  Flexion/pinch negative.    Pulses DP present, PT present.  Motor normal 5/5 strength in all tested muscle groups.   Sensory normal.      LEFT KNEE EXAM:    Resisted SLR negative.   The skin over the knee is intact.  Knee effusion none   Tendernes is located n/a  Range of motion- Flexion 120 deg, Extension 0 deg,     Ligament exam:   MCL  intact   Lachman intact              Post sag intact    LCL intact    Patellar apprehension negative.  Popliteal cyst negative  Patellar crepitation absent.  Flexion/pinch negative.    Pulses DP present, PT present.  Motor normal 5/5 strength in all tested muscle groups.   Sensory normal.      XRAY INTERPRETATION:  X-rays of right knee dated 11/28/20 are personally reviewed and show moderate medial/mild lateral joint space narrowing right knee. AP shows mild/moderate medial joint line narrowing left knee.        Assessment:       Encounter Diagnosis   Name Primary?    Primary osteoarthritis of right knee Yes          Plan:       Diagnoses and all orders for this visit:    Primary osteoarthritis of right knee      She has intermittent right knee pain that is more medial. Pain is worse with standing and walking. Better with rest. Doing better since seeing UC. Known moderate medial/mild lateral joint space narrowing right knee. AP shows mild/moderate medial joint line narrowing left knee.     Treatment options reviewed with patient along with above right knee xrays. Following plan made:     - Continue voltaren gel. Reviewed dosing and side effects. Call if she needs refill. Okay to do prn OTC tylenol if pain is worse.   - Given hinged knee brace. Will wear this prn comfort/support with prolonged walking.   - If no improvement with above, consider PT and/or knee injection.     Follow up if symptoms worsen or fail to improve.

## 2020-12-07 ENCOUNTER — OFFICE VISIT (OUTPATIENT)
Dept: ORTHOPEDICS | Facility: CLINIC | Age: 70
End: 2020-12-07
Payer: MEDICARE

## 2020-12-07 VITALS
HEART RATE: 80 BPM | RESPIRATION RATE: 16 BRPM | BODY MASS INDEX: 41.1 KG/M2 | WEIGHT: 247 LBS | DIASTOLIC BLOOD PRESSURE: 74 MMHG | SYSTOLIC BLOOD PRESSURE: 128 MMHG

## 2020-12-07 DIAGNOSIS — M17.11 PRIMARY OSTEOARTHRITIS OF RIGHT KNEE: Primary | ICD-10-CM

## 2020-12-07 PROCEDURE — 1125F AMNT PAIN NOTED PAIN PRSNT: CPT | Mod: S$GLB,,, | Performed by: PHYSICIAN ASSISTANT

## 2020-12-07 PROCEDURE — 3288F PR FALLS RISK ASSESSMENT DOCUMENTED: ICD-10-PCS | Mod: CPTII,S$GLB,, | Performed by: PHYSICIAN ASSISTANT

## 2020-12-07 PROCEDURE — 1125F PR PAIN SEVERITY QUANTIFIED, PAIN PRESENT: ICD-10-PCS | Mod: S$GLB,,, | Performed by: PHYSICIAN ASSISTANT

## 2020-12-07 PROCEDURE — 3288F FALL RISK ASSESSMENT DOCD: CPT | Mod: CPTII,S$GLB,, | Performed by: PHYSICIAN ASSISTANT

## 2020-12-07 PROCEDURE — 3008F PR BODY MASS INDEX (BMI) DOCUMENTED: ICD-10-PCS | Mod: CPTII,S$GLB,, | Performed by: PHYSICIAN ASSISTANT

## 2020-12-07 PROCEDURE — 1159F PR MEDICATION LIST DOCUMENTED IN MEDICAL RECORD: ICD-10-PCS | Mod: S$GLB,,, | Performed by: PHYSICIAN ASSISTANT

## 2020-12-07 PROCEDURE — 3074F PR MOST RECENT SYSTOLIC BLOOD PRESSURE < 130 MM HG: ICD-10-PCS | Mod: CPTII,S$GLB,, | Performed by: PHYSICIAN ASSISTANT

## 2020-12-07 PROCEDURE — 99203 OFFICE O/P NEW LOW 30 MIN: CPT | Mod: S$GLB,,, | Performed by: PHYSICIAN ASSISTANT

## 2020-12-07 PROCEDURE — 99203 PR OFFICE/OUTPT VISIT, NEW, LEVL III, 30-44 MIN: ICD-10-PCS | Mod: S$GLB,,, | Performed by: PHYSICIAN ASSISTANT

## 2020-12-07 PROCEDURE — 3078F PR MOST RECENT DIASTOLIC BLOOD PRESSURE < 80 MM HG: ICD-10-PCS | Mod: CPTII,S$GLB,, | Performed by: PHYSICIAN ASSISTANT

## 2020-12-07 PROCEDURE — 99999 PR PBB SHADOW E&M-EST. PATIENT-LVL III: ICD-10-PCS | Mod: PBBFAC,,, | Performed by: PHYSICIAN ASSISTANT

## 2020-12-07 PROCEDURE — 1101F PT FALLS ASSESS-DOCD LE1/YR: CPT | Mod: CPTII,S$GLB,, | Performed by: PHYSICIAN ASSISTANT

## 2020-12-07 PROCEDURE — 99999 PR PBB SHADOW E&M-EST. PATIENT-LVL III: CPT | Mod: PBBFAC,,, | Performed by: PHYSICIAN ASSISTANT

## 2020-12-07 PROCEDURE — 3078F DIAST BP <80 MM HG: CPT | Mod: CPTII,S$GLB,, | Performed by: PHYSICIAN ASSISTANT

## 2020-12-07 PROCEDURE — 3074F SYST BP LT 130 MM HG: CPT | Mod: CPTII,S$GLB,, | Performed by: PHYSICIAN ASSISTANT

## 2020-12-07 PROCEDURE — 1101F PR PT FALLS ASSESS DOC 0-1 FALLS W/OUT INJ PAST YR: ICD-10-PCS | Mod: CPTII,S$GLB,, | Performed by: PHYSICIAN ASSISTANT

## 2020-12-07 PROCEDURE — 3008F BODY MASS INDEX DOCD: CPT | Mod: CPTII,S$GLB,, | Performed by: PHYSICIAN ASSISTANT

## 2020-12-07 PROCEDURE — 1159F MED LIST DOCD IN RCRD: CPT | Mod: S$GLB,,, | Performed by: PHYSICIAN ASSISTANT

## 2020-12-07 NOTE — PATIENT INSTRUCTIONS
It was nice to meet you today! I am sorry that you are hurting so much.     You have some wear and tear in your right knee (arthritis) and this is likely what is causing your pain.     Wear the knee brace as needed for prolonged walking. This should give you added support and help with pain.     Continue on diclofenac gel to help with pain/inflammation. Use as directed. Call me if you need a refill. Okay to continue with over the counter tylenol here and there for pain. Take with food.     If no improvement with above, we can consider physical therapy or a knee injection.     Please stay in touch and call me if you need anything. You can also send me a message in [facility].     Sharlene   [phone]

## 2020-12-10 ENCOUNTER — OFFICE VISIT (OUTPATIENT)
Dept: FAMILY MEDICINE | Facility: CLINIC | Age: 70
End: 2020-12-10
Payer: MEDICARE

## 2020-12-10 VITALS
TEMPERATURE: 98 F | SYSTOLIC BLOOD PRESSURE: 132 MMHG | WEIGHT: 246.13 LBS | HEIGHT: 65 IN | DIASTOLIC BLOOD PRESSURE: 88 MMHG | HEART RATE: 93 BPM | RESPIRATION RATE: 18 BRPM | BODY MASS INDEX: 41.01 KG/M2 | OXYGEN SATURATION: 96 %

## 2020-12-10 DIAGNOSIS — Z13.6 ENCOUNTER FOR LIPID SCREENING FOR CARDIOVASCULAR DISEASE: ICD-10-CM

## 2020-12-10 DIAGNOSIS — R00.2 PALPITATIONS: Primary | ICD-10-CM

## 2020-12-10 DIAGNOSIS — I10 HYPERTENSION, UNSPECIFIED TYPE: ICD-10-CM

## 2020-12-10 DIAGNOSIS — Z13.220 ENCOUNTER FOR LIPID SCREENING FOR CARDIOVASCULAR DISEASE: ICD-10-CM

## 2020-12-10 PROCEDURE — 1159F PR MEDICATION LIST DOCUMENTED IN MEDICAL RECORD: ICD-10-PCS | Mod: S$GLB,,, | Performed by: FAMILY MEDICINE

## 2020-12-10 PROCEDURE — 1159F MED LIST DOCD IN RCRD: CPT | Mod: S$GLB,,, | Performed by: FAMILY MEDICINE

## 2020-12-10 PROCEDURE — 99999 PR PBB SHADOW E&M-EST. PATIENT-LVL V: CPT | Mod: PBBFAC,,, | Performed by: FAMILY MEDICINE

## 2020-12-10 PROCEDURE — 1126F AMNT PAIN NOTED NONE PRSNT: CPT | Mod: S$GLB,,, | Performed by: FAMILY MEDICINE

## 2020-12-10 PROCEDURE — 1126F PR PAIN SEVERITY QUANTIFIED, NO PAIN PRESENT: ICD-10-PCS | Mod: S$GLB,,, | Performed by: FAMILY MEDICINE

## 2020-12-10 PROCEDURE — 3075F SYST BP GE 130 - 139MM HG: CPT | Mod: CPTII,S$GLB,, | Performed by: FAMILY MEDICINE

## 2020-12-10 PROCEDURE — 93005 EKG 12-LEAD: ICD-10-PCS | Mod: S$GLB,,, | Performed by: FAMILY MEDICINE

## 2020-12-10 PROCEDURE — 3008F BODY MASS INDEX DOCD: CPT | Mod: CPTII,S$GLB,, | Performed by: FAMILY MEDICINE

## 2020-12-10 PROCEDURE — 93010 EKG 12-LEAD: ICD-10-PCS | Mod: S$GLB,,, | Performed by: INTERNAL MEDICINE

## 2020-12-10 PROCEDURE — 93005 ELECTROCARDIOGRAM TRACING: CPT | Mod: S$GLB,,, | Performed by: FAMILY MEDICINE

## 2020-12-10 PROCEDURE — 3079F PR MOST RECENT DIASTOLIC BLOOD PRESSURE 80-89 MM HG: ICD-10-PCS | Mod: CPTII,S$GLB,, | Performed by: FAMILY MEDICINE

## 2020-12-10 PROCEDURE — 99214 OFFICE O/P EST MOD 30 MIN: CPT | Mod: 25,S$GLB,, | Performed by: FAMILY MEDICINE

## 2020-12-10 PROCEDURE — 90670 PCV13 VACCINE IM: CPT | Mod: S$GLB,,, | Performed by: FAMILY MEDICINE

## 2020-12-10 PROCEDURE — 93010 ELECTROCARDIOGRAM REPORT: CPT | Mod: S$GLB,,, | Performed by: INTERNAL MEDICINE

## 2020-12-10 PROCEDURE — 99214 PR OFFICE/OUTPT VISIT, EST, LEVL IV, 30-39 MIN: ICD-10-PCS | Mod: 25,S$GLB,, | Performed by: FAMILY MEDICINE

## 2020-12-10 PROCEDURE — 3079F DIAST BP 80-89 MM HG: CPT | Mod: CPTII,S$GLB,, | Performed by: FAMILY MEDICINE

## 2020-12-10 PROCEDURE — 3075F PR MOST RECENT SYSTOLIC BLOOD PRESS GE 130-139MM HG: ICD-10-PCS | Mod: CPTII,S$GLB,, | Performed by: FAMILY MEDICINE

## 2020-12-10 PROCEDURE — G0009 ADMIN PNEUMOCOCCAL VACCINE: HCPCS | Mod: S$GLB,,, | Performed by: FAMILY MEDICINE

## 2020-12-10 PROCEDURE — 3008F PR BODY MASS INDEX (BMI) DOCUMENTED: ICD-10-PCS | Mod: CPTII,S$GLB,, | Performed by: FAMILY MEDICINE

## 2020-12-10 PROCEDURE — G0009 PNEUMOCOCCAL CONJUGATE VACCINE 13-VALENT LESS THAN 5YO & GREATER THAN: ICD-10-PCS | Mod: S$GLB,,, | Performed by: FAMILY MEDICINE

## 2020-12-10 PROCEDURE — 99999 PR PBB SHADOW E&M-EST. PATIENT-LVL V: ICD-10-PCS | Mod: PBBFAC,,, | Performed by: FAMILY MEDICINE

## 2020-12-10 PROCEDURE — 90670 PNEUMOCOCCAL CONJUGATE VACCINE 13-VALENT LESS THAN 5YO & GREATER THAN: ICD-10-PCS | Mod: S$GLB,,, | Performed by: FAMILY MEDICINE

## 2020-12-10 NOTE — PROGRESS NOTES
"EST PATIENT VISIT FAMILY MEDICINE    CC:   Chief Complaint   Patient presents with    Establish Care    Anxiety       HPI: Ludmila Jimenez  is a 70 y.o. female:    Feels a "weak, shivery" feeling in her chest area  Has had this before where she had weakness in her arms and legs  Currently she is feeling fluttering in her chest area  No chest pain  She has had this for the past week  When she's busy she does not notice it  Denies exertional symptoms  Notes this when she sits still  She did have an EKG in 2018 which showed sinus arrhythmia  She had an NM stress in 2015 which was normal, EKG portion showed PVCs    Has been told her pressure is always high at the doctor's appt     ROS: Review of Systems   Constitutional: Negative for fever.   Respiratory: Negative for shortness of breath.    Cardiovascular: Positive for palpitations. Negative for chest pain and leg swelling.       PMHX:   Past Medical History:   Diagnosis Date    High cholesterol     Hypertension        PSHX:   Past Surgical History:   Procedure Laterality Date    HYSTERECTOMY         FAMHX: History reviewed. No pertinent family history.    SOCHX:   Social History     Socioeconomic History    Marital status:      Spouse name: Not on file    Number of children: Not on file    Years of education: Not on file    Highest education level: Not on file   Occupational History    Not on file   Social Needs    Financial resource strain: Not on file    Food insecurity     Worry: Not on file     Inability: Not on file    Transportation needs     Medical: Not on file     Non-medical: Not on file   Tobacco Use    Smoking status: Never Smoker    Smokeless tobacco: Never Used   Substance and Sexual Activity    Alcohol use: Not Currently     Alcohol/week: 1.0 standard drinks     Types: 1 Glasses of wine per week     Comment: very seldom     Drug use: No    Sexual activity: Not Currently   Lifestyle    Physical activity     Days per week: " "Not on file     Minutes per session: Not on file    Stress: Not on file   Relationships    Social connections     Talks on phone: Not on file     Gets together: Not on file     Attends [employer] service: Not on file     Active member of club or organization: Not on file     Attends meetings of clubs or organizations: Not on file     Relationship status: Not on file   Other Topics Concern    Not on file   Social History Narrative    Not on file       ALLERGIES: Review of patient's allergies indicates:  No Known Allergies    MEDS:   Current Outpatient Medications:     aspirin (ECOTRIN) 81 MG EC tablet, Take 81 mg by mouth once daily., Disp: , Rfl:     atorvastatin (LIPITOR) 10 MG tablet, Take 10 mg by mouth once daily., Disp: , Rfl:     diclofenac sodium (VOLTAREN ARTHRITIS PAIN) 1 % Gel, Apply 2 g topically once daily., Disp: 50 g, Rfl: 0    hydroCHLOROthiazide (HYDRODIURIL) 25 MG tablet, , Disp: , Rfl:     metoprolol succinate (TOPROL-XL) 50 MG 24 hr tablet, TAKE 1 TABLET EVERY DAY, Disp: 90 tablet, Rfl: 3    OBJECTIVE:   Vitals:    12/10/20 0942 12/10/20 1007   BP: (!) 150/90 132/88   BP Location: Left arm    Patient Position: Sitting    BP Method: X-Large (Manual)    Pulse: 93    Resp: 18    Temp: 98.1 °F (36.7 °C)    TempSrc: Oral    SpO2: 96%    Weight: 111.6 kg (246 lb 1.6 oz)    Height: 5' 5" (1.651 m)      Body mass index is 40.95 kg/m².      Physical Exam  Vitals signs and nursing note reviewed.   Constitutional:       Appearance: Normal appearance.   HENT:      Head: Normocephalic and atraumatic.   Eyes:      General: No scleral icterus.     Extraocular Movements: Extraocular movements intact.   Pulmonary:      Effort: Pulmonary effort is normal.   Neurological:      Mental Status: She is alert.           ASSESSMENT & PLAN:  Encounter Diagnoses   Name Primary?    Palpitations Yes    Encounter for lipid screening for cardiovascular disease      Hx of sinus arrhythmia, PVC; reports normal cardiac " work up at . Records requested. Will order labs, holter and echo and refer to Cardiology.   Her repeat BP is well controlled.     Orders Placed This Encounter    Pneumococcal Conjugate Vaccine (13 Valent) (IM)    CBC Auto Differential    Comprehensive Metabolic Panel    TSH    T4, Free    Lipid Panel    Ambulatory referral/consult to Cardiology    Holter monitor - 48 hour    EKG 12-lead    Echo Color Flow Doppler? Yes       Follow up in about 3 months (around 3/10/2021) for blood pressure.    RTC/ED precautions discussed where applicable.   Encouraged patient to let me know if there are any further questions/concerns.     Advise patient/caretaker to check with insurance regarding orders to avoid unexpected fees/costs.     The patient/caretaker indicates understanding of these issues and agrees with the plan.    Dr. Gay Langford MD  Family Medicine

## 2020-12-10 NOTE — LETTER
December 10, 2020      Sharlene Reilly PA-C  [facility] [facility] [address]           [facility]  [address], [facility] [address]  [facility] [address]  Phone: [phone]  Fax: [phone]          Patient: Ludmila Jimenez   MR Number: [id number]   YOB: 1950   Date of Visit: 12/10/2020       Dear Sharlene Reilly:    Thank you for referring Ludmila Jimenez to me for evaluation. Attached you will find relevant portions of my assessment and plan of care.    If you have questions, please do not hesitate to call me. I look forward to following Ludmila Jimenez along with you.    Sincerely,    Gay Langford MD    Enclosure  CC:  No Recipients    If you would like to receive this communication electronically, please contact [email or web address] or [phone] to request more information on [employer] Link access.    For providers and/or their staff who would like to refer a patient to [facility], please contact us through our one-stop-shop provider referral line, [facility], at [phone].    If you feel you have received this communication in error or would no longer like to receive these types of communications, please e-mail [email or web address]

## 2020-12-22 ENCOUNTER — OFFICE VISIT (OUTPATIENT)
Dept: CARDIOLOGY | Facility: CLINIC | Age: 70
End: 2020-12-22
Payer: MEDICARE

## 2020-12-22 VITALS
HEART RATE: 93 BPM | DIASTOLIC BLOOD PRESSURE: 88 MMHG | WEIGHT: 243.5 LBS | OXYGEN SATURATION: 99 % | HEIGHT: 65 IN | SYSTOLIC BLOOD PRESSURE: 127 MMHG | BODY MASS INDEX: 40.57 KG/M2

## 2020-12-22 DIAGNOSIS — I10 ESSENTIAL HYPERTENSION: ICD-10-CM

## 2020-12-22 DIAGNOSIS — E66.01 MORBID OBESITY: ICD-10-CM

## 2020-12-22 DIAGNOSIS — R00.2 PALPITATIONS: ICD-10-CM

## 2020-12-22 PROCEDURE — 3079F PR MOST RECENT DIASTOLIC BLOOD PRESSURE 80-89 MM HG: ICD-10-PCS | Mod: CPTII,S$GLB,, | Performed by: INTERNAL MEDICINE

## 2020-12-22 PROCEDURE — 1101F PT FALLS ASSESS-DOCD LE1/YR: CPT | Mod: CPTII,S$GLB,, | Performed by: INTERNAL MEDICINE

## 2020-12-22 PROCEDURE — 3288F PR FALLS RISK ASSESSMENT DOCUMENTED: ICD-10-PCS | Mod: CPTII,S$GLB,, | Performed by: INTERNAL MEDICINE

## 2020-12-22 PROCEDURE — 99203 OFFICE O/P NEW LOW 30 MIN: CPT | Mod: S$GLB,,, | Performed by: INTERNAL MEDICINE

## 2020-12-22 PROCEDURE — 1159F MED LIST DOCD IN RCRD: CPT | Mod: S$GLB,,, | Performed by: INTERNAL MEDICINE

## 2020-12-22 PROCEDURE — 1159F PR MEDICATION LIST DOCUMENTED IN MEDICAL RECORD: ICD-10-PCS | Mod: S$GLB,,, | Performed by: INTERNAL MEDICINE

## 2020-12-22 PROCEDURE — 99999 PR PBB SHADOW E&M-EST. PATIENT-LVL IV: ICD-10-PCS | Mod: PBBFAC,,, | Performed by: INTERNAL MEDICINE

## 2020-12-22 PROCEDURE — 3008F PR BODY MASS INDEX (BMI) DOCUMENTED: ICD-10-PCS | Mod: CPTII,S$GLB,, | Performed by: INTERNAL MEDICINE

## 2020-12-22 PROCEDURE — 1126F AMNT PAIN NOTED NONE PRSNT: CPT | Mod: S$GLB,,, | Performed by: INTERNAL MEDICINE

## 2020-12-22 PROCEDURE — 3288F FALL RISK ASSESSMENT DOCD: CPT | Mod: CPTII,S$GLB,, | Performed by: INTERNAL MEDICINE

## 2020-12-22 PROCEDURE — 99203 PR OFFICE/OUTPT VISIT, NEW, LEVL III, 30-44 MIN: ICD-10-PCS | Mod: S$GLB,,, | Performed by: INTERNAL MEDICINE

## 2020-12-22 PROCEDURE — 1101F PR PT FALLS ASSESS DOC 0-1 FALLS W/OUT INJ PAST YR: ICD-10-PCS | Mod: CPTII,S$GLB,, | Performed by: INTERNAL MEDICINE

## 2020-12-22 PROCEDURE — 3079F DIAST BP 80-89 MM HG: CPT | Mod: CPTII,S$GLB,, | Performed by: INTERNAL MEDICINE

## 2020-12-22 PROCEDURE — 1126F PR PAIN SEVERITY QUANTIFIED, NO PAIN PRESENT: ICD-10-PCS | Mod: S$GLB,,, | Performed by: INTERNAL MEDICINE

## 2020-12-22 PROCEDURE — 3074F PR MOST RECENT SYSTOLIC BLOOD PRESSURE < 130 MM HG: ICD-10-PCS | Mod: CPTII,S$GLB,, | Performed by: INTERNAL MEDICINE

## 2020-12-22 PROCEDURE — 3074F SYST BP LT 130 MM HG: CPT | Mod: CPTII,S$GLB,, | Performed by: INTERNAL MEDICINE

## 2020-12-22 PROCEDURE — 3008F BODY MASS INDEX DOCD: CPT | Mod: CPTII,S$GLB,, | Performed by: INTERNAL MEDICINE

## 2020-12-22 PROCEDURE — 99999 PR PBB SHADOW E&M-EST. PATIENT-LVL IV: CPT | Mod: PBBFAC,,, | Performed by: INTERNAL MEDICINE

## 2020-12-22 NOTE — ASSESSMENT & PLAN NOTE
She confirms that her blood pressures have been stable.  Her echocardiogram did not show LVH.  I do not advise medication changes.

## 2020-12-22 NOTE — ASSESSMENT & PLAN NOTE
Her Holter monitor showed rare PACs and PVCs.  Her echo confirms normal ejection fraction without significant structural heart disease.  Reassurance provided.  We discussed the possibility of increasing metoprolol dosing if her palpitations continue to bother her.

## 2020-12-22 NOTE — PROGRESS NOTES
[facility] Cardiology     Subjective:    Patient ID:  Ludmila Jimenez is a 70 y.o. female who presents for evaluation of Palpitations and Hypertension    Review of patient's allergies indicates:  No Known Allergies   The patient complained of palpitations to her PCP.  She did complain of fatigue symptoms and some dizziness.  She has had hypertension for about 6 years.  A Holter monitor was done.  It showed rare PACs and PVCs.  There was mild sinus bradycardia.  She is on metoprolol 50 mg per day.  She had an echocardiogram.  It showed no significant abnormalities with ejection fraction 65%.  She had a negative Lexiscan in 2015.  She does not have complaints of chest pain or shortness of breath.  She states her palpitations began bothering her a few weeks ago.  She states her main complaint is fatigue.  Her dizziness complaints have been present for a couple of months.  She had lab showing normal thyroid studies and renal function without anemia.    The patient's palpitations are predominantly when she is in a quiet room or most audible at night hours.  She has never had near-syncope or syncope.  She cannot say that the palpitations coincide with her dizziness on a regular basis.  She has never had sustained tachycardia.      Review of Systems   Constitution: Positive for malaise/fatigue and weight gain. Negative for chills, decreased appetite, diaphoresis, fever, night sweats and weight loss.   HENT: Negative for congestion, ear discharge, ear pain, hearing loss, hoarse voice, nosebleeds, odynophagia, sore throat, stridor and tinnitus.    Eyes: Negative for blurred vision, discharge, double vision, pain, photophobia, redness, vision loss in left eye, vision loss in right eye, visual disturbance and visual halos.   Cardiovascular: Positive for palpitations. Negative for chest pain, claudication, cyanosis, dyspnea on exertion, irregular  heartbeat, leg swelling, near-syncope, orthopnea, paroxysmal nocturnal dyspnea and syncope.   Respiratory: Negative for cough, hemoptysis, shortness of breath, sleep disturbances due to breathing, snoring, sputum production and wheezing.    Endocrine: Negative for cold intolerance, heat intolerance, polydipsia, polyphagia and polyuria.   Hematologic/Lymphatic: Negative for adenopathy and bleeding problem. Does not bruise/bleed easily.   Skin: Negative for color change, dry skin, flushing, itching, nail changes, poor wound healing, rash, skin cancer, suspicious lesions and unusual hair distribution.   Musculoskeletal: Negative for arthritis, back pain, falls, gout, joint pain, joint swelling, muscle cramps, muscle weakness, myalgias, neck pain and stiffness.   Gastrointestinal: Negative for bloating, abdominal pain, anorexia, change in bowel habit, bowel incontinence, constipation, diarrhea, dysphagia, excessive appetite, flatus, heartburn, hematemesis, hematochezia, hemorrhoids, jaundice, melena, nausea and vomiting.   Genitourinary: Negative for bladder incontinence, decreased libido, dysuria, flank pain, frequency, genital sores, hematuria, hesitancy, incomplete emptying, nocturia and urgency.   Neurological: Positive for dizziness. Negative for aphonia, brief paralysis, difficulty with concentration, disturbances in coordination, excessive daytime sleepiness, focal weakness, headaches, light-headedness, loss of balance, numbness, paresthesias, seizures, sensory change, tremors, vertigo and weakness.   Psychiatric/Behavioral: Negative for altered mental status, depression, hallucinations, memory loss, substance abuse, suicidal ideas and thoughts of violence. The patient does not have insomnia and is not nervous/anxious.    Allergic/Immunologic: Negative for hives and persistent infections.        Objective:       Vitals:    12/22/20 1333   BP: 127/88   Pulse: 93   SpO2: 99%   Weight: 110.5 kg (243 lb 8 oz)  "  Height: 5' 5" (1.651 m)    Physical Exam   Constitutional: She is oriented to person, place, and time. She appears well-developed and well-nourished. No distress.   HENT:   Head: Normocephalic and atraumatic.   Nose: Nose normal.   Mouth/Throat: Oropharynx is clear and moist.   Eyes: Pupils are equal, round, and reactive to light. Conjunctivae and EOM are normal. Right eye exhibits no discharge. No scleral icterus.   Neck: Normal range of motion. Neck supple. No JVD present. No tracheal deviation present. No thyromegaly present.   Cardiovascular: Normal rate, regular rhythm, normal heart sounds and intact distal pulses. Exam reveals no gallop and no friction rub.   No murmur heard.  Pulses:       Carotid pulses are 2+ on the right side and 2+ on the left side.       Radial pulses are 2+ on the right side and 2+ on the left side.        Popliteal pulses are 2+ on the right side and 2+ on the left side.        Dorsalis pedis pulses are 2+ on the right side and 2+ on the left side.        Posterior tibial pulses are 2+ on the right side and 2+ on the left side.   Pulmonary/Chest: Effort normal and breath sounds normal. No stridor. No respiratory distress. She has no wheezes. She has no rales. She exhibits no tenderness.   Abdominal: Soft. Bowel sounds are normal. She exhibits no distension and no mass. There is no abdominal tenderness. There is no rebound and no guarding.   Musculoskeletal: Normal range of motion.         General: No tenderness or edema.   Lymphadenopathy:     She has no cervical adenopathy.   Neurological: She is alert and oriented to person, place, and time. No cranial nerve deficit. Coordination normal.   Skin: Skin is warm and dry. No rash noted. She is not diaphoretic. No erythema. No pallor.   Psychiatric: She has a normal mood and affect. Her behavior is normal. Judgment and thought content normal.         Assessment:       1. Palpitations    2. Essential hypertension    3. Morbid obesity  "     Results for orders placed or performed during the hospital encounter of 12/14/20   Echo Color Flow Doppler? Yes   Result Value Ref Range    BSA 2.26 m2    LA WIDTH 3.67 cm    AORTIC VALVE CUSP SEPERATION 1.87 cm    PV PEAK VELOCITY 1.08 cm/s    LVIDd 5.23 3.5 - 6.0 cm    IVS 0.59 (A) 0.6 - 1.1 cm    Posterior Wall 0.65 0.6 - 1.1 cm    Ao root annulus 3.30 cm    LVIDs 3.57 2.1 - 4.0 cm    FS 32 28 - 44 %    LA volume 55.69 cm3    STJ 2.80 cm    Ascending aorta 3.30 cm    LV mass 106.92 g    LA size 3.85 cm    RVDD 3.49 cm    TAPSE 2.11 cm    Left Ventricle Relative Wall Thickness 0.25 cm    AV mean gradient 4 mmHg    AV valve area 2.77 cm2    AV Velocity Ratio 0.62     AV index (prosthetic) 0.68     E/A ratio 0.57     E wave decelartion time 270.84 msec    IVRT 93.43 msec    Pulm vein S/D ratio 2.06     LVOT diameter 2.27 cm    LVOT area 4.0 cm2    LVOT peak tobin 0.88 m/s    LVOT peak VTI 17.37 cm    Ao peak tobin 1.42 m/s    Ao VTI 25.40 cm    Mr max tobin 0.03 m/s    LVOT stroke volume 70.26 cm3    AV peak gradient 8 mmHg    MV Peak E Tobin 0.58 m/s    TR Max Tobin 2.96 m/s    MV Peak A Tobin 1.01 m/s    PV Peak S Tobin 0.68 m/s    PV Peak D Tobin 0.33 m/s    LV Systolic Volume 53.30 mL    LV Systolic Volume Index 24.7 mL/m2    LV Diastolic Volume 131.49 mL    LV Diastolic Volume Index 60.98 mL/m2    LA Volume Index 25.8 mL/m2    LV Mass Index 50 g/m2    RA Major Axis 4.18 cm    Left Atrium Minor Axis 4.45 cm    Left Atrium Major Axis 4.84 cm    Triscuspid Valve Regurgitation Peak Gradient 35 mmHg    Right Atrial Pressure (from IVC) 3 mmHg    TV rest pulmonary artery pressure 38 mmHg     Lab Results   Component Value Date    WBC 6.11 12/11/2020    RBC 4.50 12/11/2020    HGB 12.2 12/11/2020    HCT 39.9 12/11/2020    MCV 89 12/11/2020    MCH 27.1 12/11/2020    MCHC 30.6 (L) 12/11/2020    RDW 13.2 12/11/2020     12/11/2020    MPV 9.7 12/11/2020    GRAN 3.5 12/11/2020    GRAN 57.1 12/11/2020    LYMPH 1.5 12/11/2020     LYMPH 24.7 12/11/2020    MONO 0.7 12/11/2020    MONO 11.5 12/11/2020    EOS 0.4 12/11/2020    BASO 0.03 12/11/2020    EOSINOPHIL 5.9 12/11/2020    BASOPHIL 0.5 12/11/2020        CMP  Lab Results   Component Value Date     12/11/2020    K 4.3 12/11/2020     12/11/2020    CO2 32 (H) 12/11/2020     12/11/2020    BUN 14 12/11/2020    CREATININE 0.88 12/11/2020    CALCIUM 9.3 12/11/2020    PROT 8.0 12/11/2020    ALBUMIN 4.2 12/11/2020    BILITOT 0.6 12/11/2020    ALKPHOS 126 12/11/2020    AST 23 12/11/2020    ALT 12 12/11/2020    ANIONGAP 7 (L) 12/11/2020    ESTGFRAFRICA >60.0 12/11/2020    EGFRNONAA >60.0 12/11/2020        No results found for: LABBLOO, LABURIN, RESPIRATORYC, GSRESP         Results for orders placed or performed in visit on 12/10/20   EKG 12-lead    Collection Time: 12/10/20 11:27 AM    Narrative    Test Reason : R00.2,    Vent. Rate : 079 BPM     Atrial Rate : 079 BPM     P-R Int : 180 ms          QRS Dur : 088 ms      QT Int : 368 ms       P-R-T Axes : 054 -13 029 degrees     QTc Int : 421 ms    Normal sinus rhythm  Normal ECG  When compared with ECG of 25-JUL-2018 10:58,  No significant change was found  Confirmed by Sean TRACY MD, Alex WILKES ([id number]) on 12/11/2020 11:33:57 AM    Referred By: JAZZ CUMMINGS           Confirmed By:Alex Estrada III, MD        Echo Color Flow Doppler? Yes 12/14/2020 [id number] Final   Holter monitor - 48 hour 12/14/2020 [id number] Final   XR KNEE 3 VIEW RIGHT 11/28/2020 [id number] Final            Plan:       Problem List Items Addressed This Visit        Cardiac/Vascular    Essential hypertension     She confirms that her blood pressures have been stable.  Her echocardiogram did not show LVH.  I do not advise medication changes.         Palpitations     Her Holter monitor showed rare PACs and PVCs.  Her echo confirms normal ejection fraction without significant structural heart disease.  Reassurance provided.  We discussed the possibility of increasing  metoprolol dosing if her palpitations continue to bother her.            Endocrine    Morbid obesity     She has gained weight.  She has fatigue symptoms.  Weight loss advised.                  Thank you for allowing me to participate in your patient's care.    Follow-up for now can be on an as-needed basis.  Please feel free to send her back for reassessment if I can be of assistance.         Tono Patricio MD  12/22/2020   2:17 PM

## 2020-12-22 NOTE — LETTER
December 22, 2020      Gay Langford MD  [address] [place] [address]           [facility]  [address], [address]  [place] [address]  Phone: [phone]  Fax: [phone]          Patient: Ludmila Jimenez   MR Number: [id number]   YOB: 1950   Date of Visit: 12/22/2020       Dear Dr. Gay Langford:    Thank you for referring Ludmila Jimenez to me for evaluation. Attached you will find relevant portions of my assessment and plan of care.    If you have questions, please do not hesitate to call me. I look forward to following Ludmila Jimenez along with you.    Sincerely,    Tono Patricio MD    Enclosure  CC:  No Recipients    If you would like to receive this communication electronically, please contact [email or web address] or [phone] to request more information on [employer] Link access.    For providers and/or their staff who would like to refer a patient to [facility], please contact us through our one-stop-shop provider referral line, [facility], at [phone].    If you feel you have received this communication in error or would no longer like to receive these types of communications, please e-mail [email or web address]

## 2021-02-11 RX ORDER — HYDROCHLOROTHIAZIDE 25 MG/1
25 TABLET ORAL DAILY
Qty: 90 TABLET | Refills: 1 | Status: SHIPPED | OUTPATIENT
Start: 2021-02-11 | End: 2021-02-17 | Stop reason: SDUPTHER

## 2021-02-11 RX ORDER — ATORVASTATIN CALCIUM 10 MG/1
10 TABLET, FILM COATED ORAL DAILY
Qty: 90 TABLET | Refills: 3 | Status: SHIPPED | OUTPATIENT
Start: 2021-02-11 | End: 2021-02-17 | Stop reason: SDUPTHER

## 2021-02-13 ENCOUNTER — IMMUNIZATION (OUTPATIENT)
Dept: FAMILY MEDICINE | Facility: CLINIC | Age: 71
End: 2021-02-13
Payer: MEDICARE

## 2021-02-13 DIAGNOSIS — Z23 NEED FOR VACCINATION: Primary | ICD-10-CM

## 2021-02-13 PROCEDURE — 91300 COVID-19, MRNA, LNP-S, PF, 30 MCG/0.3 ML DOSE VACCINE: CPT | Mod: PBBFAC | Performed by: FAMILY MEDICINE

## 2021-02-18 DIAGNOSIS — Z12.11 COLON CANCER SCREENING: ICD-10-CM

## 2021-02-18 RX ORDER — ATORVASTATIN CALCIUM 10 MG/1
10 TABLET, FILM COATED ORAL DAILY
Qty: 90 TABLET | Refills: 3 | Status: SHIPPED | OUTPATIENT
Start: 2021-02-18 | End: 2021-03-10 | Stop reason: SDUPTHER

## 2021-02-18 RX ORDER — HYDROCHLOROTHIAZIDE 25 MG/1
25 TABLET ORAL DAILY
Qty: 90 TABLET | Refills: 1 | Status: SHIPPED | OUTPATIENT
Start: 2021-02-18 | End: 2021-03-10 | Stop reason: SDUPTHER

## 2021-03-06 ENCOUNTER — IMMUNIZATION (OUTPATIENT)
Dept: FAMILY MEDICINE | Facility: CLINIC | Age: 71
End: 2021-03-06
Payer: MEDICARE

## 2021-03-06 DIAGNOSIS — Z23 NEED FOR VACCINATION: Primary | ICD-10-CM

## 2021-03-06 PROCEDURE — 0002A COVID-19, MRNA, LNP-S, PF, 30 MCG/0.3 ML DOSE VACCINE: ICD-10-PCS | Mod: CV19,S$GLB,, | Performed by: FAMILY MEDICINE

## 2021-03-06 PROCEDURE — 0002A COVID-19, MRNA, LNP-S, PF, 30 MCG/0.3 ML DOSE VACCINE: CPT | Mod: CV19,S$GLB,, | Performed by: FAMILY MEDICINE

## 2021-03-06 PROCEDURE — 91300 COVID-19, MRNA, LNP-S, PF, 30 MCG/0.3 ML DOSE VACCINE: ICD-10-PCS | Mod: S$GLB,,, | Performed by: FAMILY MEDICINE

## 2021-03-06 PROCEDURE — 91300 COVID-19, MRNA, LNP-S, PF, 30 MCG/0.3 ML DOSE VACCINE: CPT | Mod: S$GLB,,, | Performed by: FAMILY MEDICINE

## 2021-03-10 ENCOUNTER — OFFICE VISIT (OUTPATIENT)
Dept: FAMILY MEDICINE | Facility: CLINIC | Age: 71
End: 2021-03-10
Payer: MEDICARE

## 2021-03-10 VITALS
OXYGEN SATURATION: 98 % | HEART RATE: 86 BPM | DIASTOLIC BLOOD PRESSURE: 74 MMHG | HEIGHT: 65 IN | WEIGHT: 248 LBS | SYSTOLIC BLOOD PRESSURE: 116 MMHG | BODY MASS INDEX: 41.32 KG/M2

## 2021-03-10 DIAGNOSIS — I10 ESSENTIAL HYPERTENSION: Primary | ICD-10-CM

## 2021-03-10 DIAGNOSIS — R00.2 PALPITATIONS: ICD-10-CM

## 2021-03-10 DIAGNOSIS — E66.01 MORBID OBESITY: ICD-10-CM

## 2021-03-10 DIAGNOSIS — G89.29 CHRONIC PAIN OF RIGHT KNEE: ICD-10-CM

## 2021-03-10 DIAGNOSIS — M25.561 CHRONIC PAIN OF RIGHT KNEE: ICD-10-CM

## 2021-03-10 PROCEDURE — 1159F PR MEDICATION LIST DOCUMENTED IN MEDICAL RECORD: ICD-10-PCS | Mod: S$GLB,,, | Performed by: FAMILY MEDICINE

## 2021-03-10 PROCEDURE — 3008F BODY MASS INDEX DOCD: CPT | Mod: CPTII,S$GLB,, | Performed by: FAMILY MEDICINE

## 2021-03-10 PROCEDURE — 1126F PR PAIN SEVERITY QUANTIFIED, NO PAIN PRESENT: ICD-10-PCS | Mod: S$GLB,,, | Performed by: FAMILY MEDICINE

## 2021-03-10 PROCEDURE — 1159F MED LIST DOCD IN RCRD: CPT | Mod: S$GLB,,, | Performed by: FAMILY MEDICINE

## 2021-03-10 PROCEDURE — 3078F DIAST BP <80 MM HG: CPT | Mod: CPTII,S$GLB,, | Performed by: FAMILY MEDICINE

## 2021-03-10 PROCEDURE — 1126F AMNT PAIN NOTED NONE PRSNT: CPT | Mod: S$GLB,,, | Performed by: FAMILY MEDICINE

## 2021-03-10 PROCEDURE — 99499 RISK ADDL DX/OHS AUDIT: ICD-10-PCS | Mod: HCNC,S$GLB,, | Performed by: FAMILY MEDICINE

## 2021-03-10 PROCEDURE — 99499 UNLISTED E&M SERVICE: CPT | Mod: HCNC,S$GLB,, | Performed by: FAMILY MEDICINE

## 2021-03-10 PROCEDURE — 3288F PR FALLS RISK ASSESSMENT DOCUMENTED: ICD-10-PCS | Mod: CPTII,S$GLB,, | Performed by: FAMILY MEDICINE

## 2021-03-10 PROCEDURE — 3288F FALL RISK ASSESSMENT DOCD: CPT | Mod: CPTII,S$GLB,, | Performed by: FAMILY MEDICINE

## 2021-03-10 PROCEDURE — 1101F PR PT FALLS ASSESS DOC 0-1 FALLS W/OUT INJ PAST YR: ICD-10-PCS | Mod: CPTII,S$GLB,, | Performed by: FAMILY MEDICINE

## 2021-03-10 PROCEDURE — 1101F PT FALLS ASSESS-DOCD LE1/YR: CPT | Mod: CPTII,S$GLB,, | Performed by: FAMILY MEDICINE

## 2021-03-10 PROCEDURE — 99999 PR PBB SHADOW E&M-EST. PATIENT-LVL III: ICD-10-PCS | Mod: PBBFAC,,, | Performed by: FAMILY MEDICINE

## 2021-03-10 PROCEDURE — 99999 PR PBB SHADOW E&M-EST. PATIENT-LVL III: CPT | Mod: PBBFAC,,, | Performed by: FAMILY MEDICINE

## 2021-03-10 PROCEDURE — 3074F PR MOST RECENT SYSTOLIC BLOOD PRESSURE < 130 MM HG: ICD-10-PCS | Mod: CPTII,S$GLB,, | Performed by: FAMILY MEDICINE

## 2021-03-10 PROCEDURE — 3008F PR BODY MASS INDEX (BMI) DOCUMENTED: ICD-10-PCS | Mod: CPTII,S$GLB,, | Performed by: FAMILY MEDICINE

## 2021-03-10 PROCEDURE — 3074F SYST BP LT 130 MM HG: CPT | Mod: CPTII,S$GLB,, | Performed by: FAMILY MEDICINE

## 2021-03-10 PROCEDURE — 99214 PR OFFICE/OUTPT VISIT, EST, LEVL IV, 30-39 MIN: ICD-10-PCS | Mod: S$GLB,,, | Performed by: FAMILY MEDICINE

## 2021-03-10 PROCEDURE — 99214 OFFICE O/P EST MOD 30 MIN: CPT | Mod: S$GLB,,, | Performed by: FAMILY MEDICINE

## 2021-03-10 PROCEDURE — 3078F PR MOST RECENT DIASTOLIC BLOOD PRESSURE < 80 MM HG: ICD-10-PCS | Mod: CPTII,S$GLB,, | Performed by: FAMILY MEDICINE

## 2021-03-10 RX ORDER — METOPROLOL SUCCINATE 50 MG/1
50 TABLET, EXTENDED RELEASE ORAL DAILY
Qty: 90 TABLET | Refills: 2 | Status: SHIPPED | OUTPATIENT
Start: 2021-03-10 | End: 2021-10-28 | Stop reason: SDUPTHER

## 2021-03-10 RX ORDER — HYDROCHLOROTHIAZIDE 25 MG/1
25 TABLET ORAL DAILY
Qty: 90 TABLET | Refills: 1 | Status: SHIPPED | OUTPATIENT
Start: 2021-03-10 | End: 2021-10-28 | Stop reason: SDUPTHER

## 2021-03-10 RX ORDER — ATORVASTATIN CALCIUM 10 MG/1
10 TABLET, FILM COATED ORAL DAILY
Qty: 90 TABLET | Refills: 3 | Status: SHIPPED | OUTPATIENT
Start: 2021-03-10 | End: 2021-10-28 | Stop reason: SDUPTHER

## 2021-03-15 PROBLEM — M25.561 CHRONIC PAIN OF RIGHT KNEE: Status: ACTIVE | Noted: 2021-03-15

## 2021-03-15 PROBLEM — G89.29 CHRONIC PAIN OF RIGHT KNEE: Status: ACTIVE | Noted: 2021-03-15

## 2021-08-31 ENCOUNTER — PATIENT OUTREACH (OUTPATIENT)
Dept: ADMINISTRATIVE | Facility: HOSPITAL | Age: 71
End: 2021-08-31

## 2021-10-05 ENCOUNTER — TELEPHONE (OUTPATIENT)
Dept: FAMILY MEDICINE | Facility: CLINIC | Age: 71
End: 2021-10-05

## 2021-10-28 ENCOUNTER — OFFICE VISIT (OUTPATIENT)
Dept: FAMILY MEDICINE | Facility: CLINIC | Age: 71
End: 2021-10-28
Payer: MEDICARE

## 2021-10-28 VITALS
SYSTOLIC BLOOD PRESSURE: 130 MMHG | DIASTOLIC BLOOD PRESSURE: 84 MMHG | WEIGHT: 246 LBS | BODY MASS INDEX: 40.98 KG/M2 | OXYGEN SATURATION: 98 % | HEART RATE: 85 BPM | HEIGHT: 65 IN

## 2021-10-28 DIAGNOSIS — E78.2 MIXED HYPERLIPIDEMIA: ICD-10-CM

## 2021-10-28 DIAGNOSIS — E66.01 MORBID OBESITY WITH BODY MASS INDEX OF 40.0-44.9 IN ADULT: ICD-10-CM

## 2021-10-28 DIAGNOSIS — Z12.31 BREAST CANCER SCREENING BY MAMMOGRAM: Primary | ICD-10-CM

## 2021-10-28 DIAGNOSIS — Z78.0 POSTMENOPAUSAL: ICD-10-CM

## 2021-10-28 DIAGNOSIS — I10 ESSENTIAL HYPERTENSION: ICD-10-CM

## 2021-10-28 PROCEDURE — 90694 VACC AIIV4 NO PRSRV 0.5ML IM: CPT | Mod: HCNC,S$GLB,, | Performed by: FAMILY MEDICINE

## 2021-10-28 PROCEDURE — 1160F PR REVIEW ALL MEDS BY PRESCRIBER/CLIN PHARMACIST DOCUMENTED: ICD-10-PCS | Mod: HCNC,CPTII,S$GLB, | Performed by: FAMILY MEDICINE

## 2021-10-28 PROCEDURE — G0008 FLU VACCINE - QUADRIVALENT - ADJUVANTED: ICD-10-PCS | Mod: HCNC,S$GLB,, | Performed by: FAMILY MEDICINE

## 2021-10-28 PROCEDURE — 3008F PR BODY MASS INDEX (BMI) DOCUMENTED: ICD-10-PCS | Mod: HCNC,CPTII,S$GLB, | Performed by: FAMILY MEDICINE

## 2021-10-28 PROCEDURE — 3079F PR MOST RECENT DIASTOLIC BLOOD PRESSURE 80-89 MM HG: ICD-10-PCS | Mod: HCNC,CPTII,S$GLB, | Performed by: FAMILY MEDICINE

## 2021-10-28 PROCEDURE — 1101F PR PT FALLS ASSESS DOC 0-1 FALLS W/OUT INJ PAST YR: ICD-10-PCS | Mod: HCNC,CPTII,S$GLB, | Performed by: FAMILY MEDICINE

## 2021-10-28 PROCEDURE — 1159F PR MEDICATION LIST DOCUMENTED IN MEDICAL RECORD: ICD-10-PCS | Mod: HCNC,CPTII,S$GLB, | Performed by: FAMILY MEDICINE

## 2021-10-28 PROCEDURE — 99214 PR OFFICE/OUTPT VISIT, EST, LEVL IV, 30-39 MIN: ICD-10-PCS | Mod: 25,HCNC,S$GLB, | Performed by: FAMILY MEDICINE

## 2021-10-28 PROCEDURE — 1159F MED LIST DOCD IN RCRD: CPT | Mod: HCNC,CPTII,S$GLB, | Performed by: FAMILY MEDICINE

## 2021-10-28 PROCEDURE — 3075F SYST BP GE 130 - 139MM HG: CPT | Mod: HCNC,CPTII,S$GLB, | Performed by: FAMILY MEDICINE

## 2021-10-28 PROCEDURE — 99499 UNLISTED E&M SERVICE: CPT | Mod: S$GLB,,, | Performed by: FAMILY MEDICINE

## 2021-10-28 PROCEDURE — 1101F PT FALLS ASSESS-DOCD LE1/YR: CPT | Mod: HCNC,CPTII,S$GLB, | Performed by: FAMILY MEDICINE

## 2021-10-28 PROCEDURE — 3288F PR FALLS RISK ASSESSMENT DOCUMENTED: ICD-10-PCS | Mod: HCNC,CPTII,S$GLB, | Performed by: FAMILY MEDICINE

## 2021-10-28 PROCEDURE — 99999 PR PBB SHADOW E&M-EST. PATIENT-LVL IV: CPT | Mod: PBBFAC,HCNC,, | Performed by: FAMILY MEDICINE

## 2021-10-28 PROCEDURE — 1160F RVW MEDS BY RX/DR IN RCRD: CPT | Mod: HCNC,CPTII,S$GLB, | Performed by: FAMILY MEDICINE

## 2021-10-28 PROCEDURE — 3075F PR MOST RECENT SYSTOLIC BLOOD PRESS GE 130-139MM HG: ICD-10-PCS | Mod: HCNC,CPTII,S$GLB, | Performed by: FAMILY MEDICINE

## 2021-10-28 PROCEDURE — 3008F BODY MASS INDEX DOCD: CPT | Mod: HCNC,CPTII,S$GLB, | Performed by: FAMILY MEDICINE

## 2021-10-28 PROCEDURE — 90694 FLU VACCINE - QUADRIVALENT - ADJUVANTED: ICD-10-PCS | Mod: HCNC,S$GLB,, | Performed by: FAMILY MEDICINE

## 2021-10-28 PROCEDURE — 99999 PR PBB SHADOW E&M-EST. PATIENT-LVL IV: ICD-10-PCS | Mod: PBBFAC,HCNC,, | Performed by: FAMILY MEDICINE

## 2021-10-28 PROCEDURE — G0008 ADMIN INFLUENZA VIRUS VAC: HCPCS | Mod: HCNC,S$GLB,, | Performed by: FAMILY MEDICINE

## 2021-10-28 PROCEDURE — 1126F AMNT PAIN NOTED NONE PRSNT: CPT | Mod: HCNC,CPTII,S$GLB, | Performed by: FAMILY MEDICINE

## 2021-10-28 PROCEDURE — 3288F FALL RISK ASSESSMENT DOCD: CPT | Mod: HCNC,CPTII,S$GLB, | Performed by: FAMILY MEDICINE

## 2021-10-28 PROCEDURE — 99499 RISK ADDL DX/OHS AUDIT: ICD-10-PCS | Mod: S$GLB,,, | Performed by: FAMILY MEDICINE

## 2021-10-28 PROCEDURE — 3079F DIAST BP 80-89 MM HG: CPT | Mod: HCNC,CPTII,S$GLB, | Performed by: FAMILY MEDICINE

## 2021-10-28 PROCEDURE — 99214 OFFICE O/P EST MOD 30 MIN: CPT | Mod: 25,HCNC,S$GLB, | Performed by: FAMILY MEDICINE

## 2021-10-28 PROCEDURE — 1126F PR PAIN SEVERITY QUANTIFIED, NO PAIN PRESENT: ICD-10-PCS | Mod: HCNC,CPTII,S$GLB, | Performed by: FAMILY MEDICINE

## 2021-10-28 RX ORDER — HYDROCHLOROTHIAZIDE 25 MG/1
25 TABLET ORAL DAILY
Qty: 90 TABLET | Refills: 3 | Status: SHIPPED | OUTPATIENT
Start: 2021-10-28 | End: 2022-10-28 | Stop reason: SDUPTHER

## 2021-10-28 RX ORDER — METOPROLOL SUCCINATE 50 MG/1
50 TABLET, EXTENDED RELEASE ORAL DAILY
Qty: 90 TABLET | Refills: 3 | Status: SHIPPED | OUTPATIENT
Start: 2021-10-28 | End: 2022-10-28 | Stop reason: SDUPTHER

## 2021-10-28 RX ORDER — ATORVASTATIN CALCIUM 10 MG/1
10 TABLET, FILM COATED ORAL DAILY
Qty: 90 TABLET | Refills: 3 | Status: SHIPPED | OUTPATIENT
Start: 2021-10-28 | End: 2022-10-28 | Stop reason: SDUPTHER

## 2021-12-29 ENCOUNTER — TELEPHONE (OUTPATIENT)
Dept: FAMILY MEDICINE | Facility: CLINIC | Age: 71
End: 2021-12-29
Payer: MEDICARE

## 2022-01-05 ENCOUNTER — OFFICE VISIT (OUTPATIENT)
Dept: FAMILY MEDICINE | Facility: CLINIC | Age: 72
End: 2022-01-05
Payer: MEDICARE

## 2022-01-05 VITALS
HEART RATE: 81 BPM | DIASTOLIC BLOOD PRESSURE: 76 MMHG | HEIGHT: 65 IN | BODY MASS INDEX: 40.04 KG/M2 | WEIGHT: 240.31 LBS | SYSTOLIC BLOOD PRESSURE: 130 MMHG | OXYGEN SATURATION: 97 %

## 2022-01-05 DIAGNOSIS — I10 PRIMARY HYPERTENSION: Chronic | ICD-10-CM

## 2022-01-05 DIAGNOSIS — M54.50 ACUTE MIDLINE LOW BACK PAIN WITHOUT SCIATICA: ICD-10-CM

## 2022-01-05 DIAGNOSIS — R39.15 URINARY URGENCY: Primary | ICD-10-CM

## 2022-01-05 DIAGNOSIS — E66.01 MORBID OBESITY WITH BODY MASS INDEX OF 40.0-44.9 IN ADULT: Chronic | ICD-10-CM

## 2022-01-05 PROCEDURE — 3008F PR BODY MASS INDEX (BMI) DOCUMENTED: ICD-10-PCS | Mod: HCNC,CPTII,S$GLB, | Performed by: FAMILY MEDICINE

## 2022-01-05 PROCEDURE — 99214 OFFICE O/P EST MOD 30 MIN: CPT | Mod: HCNC,S$GLB,, | Performed by: FAMILY MEDICINE

## 2022-01-05 PROCEDURE — 3078F PR MOST RECENT DIASTOLIC BLOOD PRESSURE < 80 MM HG: ICD-10-PCS | Mod: HCNC,CPTII,S$GLB, | Performed by: FAMILY MEDICINE

## 2022-01-05 PROCEDURE — 99499 UNLISTED E&M SERVICE: CPT | Mod: S$GLB,,, | Performed by: FAMILY MEDICINE

## 2022-01-05 PROCEDURE — 1101F PR PT FALLS ASSESS DOC 0-1 FALLS W/OUT INJ PAST YR: ICD-10-PCS | Mod: HCNC,CPTII,S$GLB, | Performed by: FAMILY MEDICINE

## 2022-01-05 PROCEDURE — 99499 RISK ADDL DX/OHS AUDIT: ICD-10-PCS | Mod: S$GLB,,, | Performed by: FAMILY MEDICINE

## 2022-01-05 PROCEDURE — 1159F MED LIST DOCD IN RCRD: CPT | Mod: HCNC,CPTII,S$GLB, | Performed by: FAMILY MEDICINE

## 2022-01-05 PROCEDURE — 99214 PR OFFICE/OUTPT VISIT, EST, LEVL IV, 30-39 MIN: ICD-10-PCS | Mod: HCNC,S$GLB,, | Performed by: FAMILY MEDICINE

## 2022-01-05 PROCEDURE — 3288F PR FALLS RISK ASSESSMENT DOCUMENTED: ICD-10-PCS | Mod: HCNC,CPTII,S$GLB, | Performed by: FAMILY MEDICINE

## 2022-01-05 PROCEDURE — 3288F FALL RISK ASSESSMENT DOCD: CPT | Mod: HCNC,CPTII,S$GLB, | Performed by: FAMILY MEDICINE

## 2022-01-05 PROCEDURE — 1101F PT FALLS ASSESS-DOCD LE1/YR: CPT | Mod: HCNC,CPTII,S$GLB, | Performed by: FAMILY MEDICINE

## 2022-01-05 PROCEDURE — 99999 PR PBB SHADOW E&M-EST. PATIENT-LVL III: ICD-10-PCS | Mod: PBBFAC,HCNC,, | Performed by: FAMILY MEDICINE

## 2022-01-05 PROCEDURE — 1159F PR MEDICATION LIST DOCUMENTED IN MEDICAL RECORD: ICD-10-PCS | Mod: HCNC,CPTII,S$GLB, | Performed by: FAMILY MEDICINE

## 2022-01-05 PROCEDURE — 3078F DIAST BP <80 MM HG: CPT | Mod: HCNC,CPTII,S$GLB, | Performed by: FAMILY MEDICINE

## 2022-01-05 PROCEDURE — 1125F AMNT PAIN NOTED PAIN PRSNT: CPT | Mod: HCNC,CPTII,S$GLB, | Performed by: FAMILY MEDICINE

## 2022-01-05 PROCEDURE — 1125F PR PAIN SEVERITY QUANTIFIED, PAIN PRESENT: ICD-10-PCS | Mod: HCNC,CPTII,S$GLB, | Performed by: FAMILY MEDICINE

## 2022-01-05 PROCEDURE — 99999 PR PBB SHADOW E&M-EST. PATIENT-LVL III: CPT | Mod: PBBFAC,HCNC,, | Performed by: FAMILY MEDICINE

## 2022-01-05 PROCEDURE — 1160F PR REVIEW ALL MEDS BY PRESCRIBER/CLIN PHARMACIST DOCUMENTED: ICD-10-PCS | Mod: HCNC,CPTII,S$GLB, | Performed by: FAMILY MEDICINE

## 2022-01-05 PROCEDURE — 3075F SYST BP GE 130 - 139MM HG: CPT | Mod: HCNC,CPTII,S$GLB, | Performed by: FAMILY MEDICINE

## 2022-01-05 PROCEDURE — 3075F PR MOST RECENT SYSTOLIC BLOOD PRESS GE 130-139MM HG: ICD-10-PCS | Mod: HCNC,CPTII,S$GLB, | Performed by: FAMILY MEDICINE

## 2022-01-05 PROCEDURE — 3008F BODY MASS INDEX DOCD: CPT | Mod: HCNC,CPTII,S$GLB, | Performed by: FAMILY MEDICINE

## 2022-01-05 PROCEDURE — 1160F RVW MEDS BY RX/DR IN RCRD: CPT | Mod: HCNC,CPTII,S$GLB, | Performed by: FAMILY MEDICINE

## 2022-01-05 NOTE — PROGRESS NOTES
EST PATIENT VISIT FAMILY MEDICINE    CC:   Chief Complaint   Patient presents with    Back Pain    Urinary Tract Infection     Urgency       HPI: Ludmila Jimenez  is a 71 y.o. female:    Urinary urgency for past 2 weeks  No gross hematuria or dysuria  [facility] in [place] is preferred pharm for abx     Back pain, lower for past 2 weeks  Has improved with Advil; took Advil 2 tab OTC BID for a few days  Now is resolved currently   No new bowel/bladder incontinence/retention corresponding with symptoms, no saddle paresthesia, no fever, night sweats or unintentional weight loss coinciding with symptoms, no smoking hx       ROS: Review of Systems   Genitourinary: Positive for urgency. Negative for dysuria, flank pain, frequency and hematuria.   Musculoskeletal: Positive for back pain.       PMHX:   Past Medical History:   Diagnosis Date    High cholesterol     Hypertension        PSHX:   Past Surgical History:   Procedure Laterality Date    HYSTERECTOMY         FAMHX: History reviewed. No pertinent family history.    SOCHX:   Social History     Socioeconomic History    Marital status:    Tobacco Use    Smoking status: Never Smoker    Smokeless tobacco: Never Used   Substance and Sexual Activity    Alcohol use: Not Currently     Alcohol/week: 1.0 standard drink     Types: 1 Glasses of wine per week     Comment: very seldom     Drug use: No    Sexual activity: Not Currently       ALLERGIES: Review of patient's allergies indicates:  No Known Allergies    MEDS:   Current Outpatient Medications:     aspirin (ECOTRIN) 81 MG EC tablet, Take 81 mg by mouth once daily., Disp: , Rfl:     atorvastatin (LIPITOR) 10 MG tablet, Take 1 tablet (10 mg total) by mouth once daily., Disp: 90 tablet, Rfl: 3    diclofenac sodium (VOLTAREN ARTHRITIS PAIN) 1 % Gel, Apply 2 g topically once daily., Disp: 50 g, Rfl: 0    hydroCHLOROthiazide (HYDRODIURIL) 25 MG tablet, Take 1 tablet (25 mg total) by mouth once daily., Disp: 90  "tablet, Rfl: 3    metoprolol succinate (TOPROL-XL) 50 MG 24 hr tablet, Take 1 tablet (50 mg total) by mouth once daily., Disp: 90 tablet, Rfl: 3    OBJECTIVE:   Vitals:    01/05/22 1126   BP: 130/76   BP Location: Left arm   Patient Position: Sitting   BP Method: Large (Manual)   Pulse: 81   SpO2: 97%   Weight: 109 kg (240 lb 4.8 oz)   Height: 5' 5" (1.651 m)     Body mass index is 39.99 kg/m².      Physical Exam  Vitals and nursing note reviewed.   Constitutional:       Appearance: Normal appearance.   HENT:      Head: Normocephalic and atraumatic.   Eyes:      General: No scleral icterus.     Extraocular Movements: Extraocular movements intact.   Pulmonary:      Effort: Pulmonary effort is normal.   Abdominal:      General: There is no distension.      Palpations: Abdomen is soft.      Tenderness: There is no abdominal tenderness. There is no right CVA tenderness, left CVA tenderness, guarding or rebound.   Neurological:      Mental Status: She is alert.           LABS:   A1C:      CBC:  Recent Labs   Lab 10/28/21  1436   WBC 6.21   RBC 4.49   Hemoglobin 12.1   Hematocrit 39.5   Platelets 331   MCV 88   MCH 26.9 L   MCHC 30.6 L     CMP:  Recent Labs   Lab 10/28/21  1436   Glucose 90   Calcium 9.7   Albumin 4.5   Total Protein 8.3   Sodium 140   Potassium 4.1   CO2 30 H   Chloride 100   BUN 13   Creatinine 0.84   Alkaline Phosphatase 115   ALT 16   AST 25   Total Bilirubin 0.4     LIPIDS:  Recent Labs   Lab 12/11/20  0959 12/11/20  0959 10/28/21  1436   TSH 0.682  --   --    HDL 51   < > 56   Cholesterol 157   < > 179   Triglycerides 90   < > 113   LDL Cholesterol 88.0   < > 100.4   HDL/Cholesterol Ratio 32.5   < > 31.3   Non-HDL Cholesterol 106   < > 123   Total Cholesterol/HDL Ratio 3.1   < > 3.2    < > = values in this interval not displayed.     TSH:  Recent Labs   Lab 12/11/20  0959   TSH 0.682         ASSESSMENT & PLAN:    Problem List Items Addressed This Visit        Cardiac/Vascular    Hypertension " (Chronic)    Overview     Well controlled. Continue current regimen              Renal/    Urinary urgency - Primary (Chronic)    Overview     Suspect postmenopausal  syndrome. Will check urine studies. No other s/s of infection         Relevant Orders    Urinalysis    Urine culture       Endocrine    Morbid obesity with body mass index of 40.0-44.9 in adult (Chronic)    Overview     The patient is asked to make an attempt to improve diet and exercise patterns to aid in medical management of this problem.            Orthopedic    Acute midline low back pain without sciatica (Chronic)    Overview     Currently resolved. Suspect MSK. No red flag s/s. Will continue to monitor. Okay to take Advil PRN per OTC instructions.                Follow up if symptoms worsen or fail to improve.      RTC/ED precautions discussed where applicable.   Encouraged patient to let me know if there are any further questions/concerns.     Advise patient/caretaker to check with insurance regarding orders to avoid unexpected fees/costs.     The patient/caretaker indicates understanding of these issues and agrees with the plan.    Dr. Gay Langford MD  Family Medicine

## 2022-01-07 RX ORDER — SULFAMETHOXAZOLE AND TRIMETHOPRIM 800; 160 MG/1; MG/1
1 TABLET ORAL 2 TIMES DAILY
Qty: 6 TABLET | Refills: 0 | Status: SHIPPED | OUTPATIENT
Start: 2022-01-07 | End: 2022-01-10

## 2022-01-12 ENCOUNTER — TELEPHONE (OUTPATIENT)
Dept: FAMILY MEDICINE | Facility: CLINIC | Age: 72
End: 2022-01-12
Payer: MEDICARE

## 2022-01-12 RX ORDER — SULFAMETHOXAZOLE AND TRIMETHOPRIM 800; 160 MG/1; MG/1
1 TABLET ORAL 2 TIMES DAILY
Qty: 6 TABLET | Refills: 0 | Status: SHIPPED | OUTPATIENT
Start: 2022-01-12 | End: 2022-01-15

## 2022-01-12 NOTE — TELEPHONE ENCOUNTER
----- Message from Gay Langford MD sent at 1/12/2022 10:09 AM CST -----  Please call patient and let her know her urine test did show a mild infection. I have sent an antibiotic called Bactrim to her pharmacy. If her symptoms persist even after antibiotics, she should let us know.

## 2022-01-18 ENCOUNTER — TELEPHONE (OUTPATIENT)
Dept: FAMILY MEDICINE | Facility: CLINIC | Age: 72
End: 2022-01-18
Payer: MEDICARE

## 2022-01-18 ENCOUNTER — PATIENT OUTREACH (OUTPATIENT)
Dept: ADMINISTRATIVE | Facility: HOSPITAL | Age: 72
End: 2022-01-18
Payer: MEDICARE

## 2022-01-18 NOTE — TELEPHONE ENCOUNTER
----- Message from Heidi Mock MA sent at 1/18/2022  9:55 AM CST -----  Patient states the antibiotic is not helping , pt wants something else called in .

## 2022-01-18 NOTE — TELEPHONE ENCOUNTER
----- Message from Toshia Bautista sent at 1/18/2022  2:29 PM CST -----  Regarding: Medication Inquiry  Type:  Needs Medical Advice    Who Called: pt    Would the patient rather a call back or a response via [facility]? Call    Best Call Back Number: [phone]    Additional Information: medication received from [facility], wants to know if she should continue taking after finishing first 3 day dose

## 2022-01-20 ENCOUNTER — PATIENT OUTREACH (OUTPATIENT)
Dept: ADMINISTRATIVE | Facility: HOSPITAL | Age: 72
End: 2022-01-20
Payer: MEDICARE

## 2022-01-20 ENCOUNTER — TELEPHONE (OUTPATIENT)
Dept: ADMINISTRATIVE | Facility: HOSPITAL | Age: 72
End: 2022-01-20
Payer: MEDICARE

## 2022-03-16 ENCOUNTER — OFFICE VISIT (OUTPATIENT)
Dept: FAMILY MEDICINE | Facility: CLINIC | Age: 72
End: 2022-03-16
Payer: MEDICARE

## 2022-03-16 VITALS — BODY MASS INDEX: 40.63 KG/M2 | HEIGHT: 65 IN | WEIGHT: 243.88 LBS

## 2022-03-16 DIAGNOSIS — G44.209 TENSION HEADACHE: Primary | ICD-10-CM

## 2022-03-16 PROCEDURE — 1160F PR REVIEW ALL MEDS BY PRESCRIBER/CLIN PHARMACIST DOCUMENTED: ICD-10-PCS | Mod: CPTII,S$GLB,, | Performed by: FAMILY MEDICINE

## 2022-03-16 PROCEDURE — 1101F PT FALLS ASSESS-DOCD LE1/YR: CPT | Mod: CPTII,S$GLB,, | Performed by: FAMILY MEDICINE

## 2022-03-16 PROCEDURE — 3288F PR FALLS RISK ASSESSMENT DOCUMENTED: ICD-10-PCS | Mod: CPTII,S$GLB,, | Performed by: FAMILY MEDICINE

## 2022-03-16 PROCEDURE — 99214 PR OFFICE/OUTPT VISIT, EST, LEVL IV, 30-39 MIN: ICD-10-PCS | Mod: S$GLB,,, | Performed by: FAMILY MEDICINE

## 2022-03-16 PROCEDURE — 99214 OFFICE O/P EST MOD 30 MIN: CPT | Mod: S$GLB,,, | Performed by: FAMILY MEDICINE

## 2022-03-16 PROCEDURE — 3008F PR BODY MASS INDEX (BMI) DOCUMENTED: ICD-10-PCS | Mod: CPTII,S$GLB,, | Performed by: FAMILY MEDICINE

## 2022-03-16 PROCEDURE — 1159F MED LIST DOCD IN RCRD: CPT | Mod: CPTII,S$GLB,, | Performed by: FAMILY MEDICINE

## 2022-03-16 PROCEDURE — 99999 PR PBB SHADOW E&M-EST. PATIENT-LVL III: ICD-10-PCS | Mod: PBBFAC,,, | Performed by: FAMILY MEDICINE

## 2022-03-16 PROCEDURE — 1159F PR MEDICATION LIST DOCUMENTED IN MEDICAL RECORD: ICD-10-PCS | Mod: CPTII,S$GLB,, | Performed by: FAMILY MEDICINE

## 2022-03-16 PROCEDURE — 1126F PR PAIN SEVERITY QUANTIFIED, NO PAIN PRESENT: ICD-10-PCS | Mod: CPTII,S$GLB,, | Performed by: FAMILY MEDICINE

## 2022-03-16 PROCEDURE — 1126F AMNT PAIN NOTED NONE PRSNT: CPT | Mod: CPTII,S$GLB,, | Performed by: FAMILY MEDICINE

## 2022-03-16 PROCEDURE — 1101F PR PT FALLS ASSESS DOC 0-1 FALLS W/OUT INJ PAST YR: ICD-10-PCS | Mod: CPTII,S$GLB,, | Performed by: FAMILY MEDICINE

## 2022-03-16 PROCEDURE — 99999 PR PBB SHADOW E&M-EST. PATIENT-LVL III: CPT | Mod: PBBFAC,,, | Performed by: FAMILY MEDICINE

## 2022-03-16 PROCEDURE — 3008F BODY MASS INDEX DOCD: CPT | Mod: CPTII,S$GLB,, | Performed by: FAMILY MEDICINE

## 2022-03-16 PROCEDURE — 1160F RVW MEDS BY RX/DR IN RCRD: CPT | Mod: CPTII,S$GLB,, | Performed by: FAMILY MEDICINE

## 2022-03-16 PROCEDURE — 3288F FALL RISK ASSESSMENT DOCD: CPT | Mod: CPTII,S$GLB,, | Performed by: FAMILY MEDICINE

## 2022-03-16 RX ORDER — NAPROXEN 500 MG/1
500 TABLET ORAL 2 TIMES DAILY PRN
Qty: 20 TABLET | Refills: 0 | Status: SHIPPED | OUTPATIENT
Start: 2022-03-16 | End: 2022-03-26

## 2022-03-16 NOTE — PROGRESS NOTES
[facility] Note    Chief Complaint      Chief Complaint   Patient presents with    head     History of Present Illness     Ludmila Jimenez is a 72 y.o. female who presents today with:    Headache   This is a new problem. The current episode started more than 1 month ago. The problem occurs intermittently. The problem has been unchanged. The pain is located in the right unilateral region. The pain radiates to the right neck. The pain quality is not similar to prior headaches. The quality of the pain is described as shooting, sharp and aching. The pain is at a severity of 8/10. The pain is mild. Associated symptoms include ear pain. Pertinent negatives include no abdominal pain, blurred vision, coughing, dizziness, fever, hearing loss, tingling, vomiting, weakness or weight loss.   Starts at the right neck and radiates behind right ear and right temporal area.      Problem List Items Addressed This Visit    None     Visit Diagnoses     Tension headache    -  Primary    Relevant Medications    naproxen (NAPROSYN) 500 MG tablet          Health Maintenance   Topic Date Due    Hepatitis C Screening  Never done    TETANUS VACCINE  Never done    Mammogram  11/15/2022    DEXA Scan  10/28/2024    Lipid Panel  10/28/2026       Past Medical History:   Diagnosis Date    High cholesterol     Hypertension        Past Surgical History:   Procedure Laterality Date    COLONOSCOPY  02/27/2020    One 4 mm polyp in the ascending colon.Repeat colonoscopy  in 7-10 years.    HYSTERECTOMY         family history is not on file.     Social History     Tobacco Use    Smoking status: Never Smoker    Smokeless tobacco: Never Used   Substance Use Topics    Alcohol use: Not Currently     Alcohol/week: 1.0 standard drink     Types: 1 Glasses of wine per week     Comment: very seldom     Drug use: No       Review of Systems   Constitutional: Negative for chills, fever, malaise/fatigue and weight loss.   HENT:  "Positive for ear pain. Negative for congestion, ear discharge and hearing loss.    Eyes: Negative for blurred vision.   Respiratory: Negative for cough and shortness of breath.    Cardiovascular: Negative for chest pain and leg swelling.   Gastrointestinal: Negative for abdominal pain, constipation, diarrhea and vomiting.   Genitourinary: Negative for dysuria.   Musculoskeletal: Negative for myalgias.   Skin: Negative for rash.   Neurological: Positive for headaches. Negative for dizziness, tingling, focal weakness and weakness.   Endo/Heme/Allergies: Does not bruise/bleed easily.   Psychiatric/Behavioral: Negative for depression and suicidal ideas.        Outpatient Encounter Medications as of 3/16/2022   Medication Sig Dispense Refill    aspirin (ECOTRIN) 81 MG EC tablet Take 81 mg by mouth once daily.      atorvastatin (LIPITOR) 10 MG tablet Take 1 tablet (10 mg total) by mouth once daily. 90 tablet 3    diclofenac sodium (VOLTAREN ARTHRITIS PAIN) 1 % Gel Apply 2 g topically once daily. 50 g 0    hydroCHLOROthiazide (HYDRODIURIL) 25 MG tablet Take 1 tablet (25 mg total) by mouth once daily. 90 tablet 3    metoprolol succinate (TOPROL-XL) 50 MG 24 hr tablet Take 1 tablet (50 mg total) by mouth once daily. 90 tablet 3    naproxen (NAPROSYN) 500 MG tablet Take 1 tablet (500 mg total) by mouth 2 (two) times daily as needed (pain). 20 tablet 0     No facility-administered encounter medications on file as of 3/16/2022.       Review of patient's allergies indicates:  No Known Allergies    Physical Exam      Vital Signs  Pain Score: 0-No pain  Height and Weight  Height: 5' 5" (165.1 cm)  Weight: 110.6 kg (243 lb 14.4 oz)  BSA (Calculated - sq m): 2.25 sq meters  BMI (Calculated): 40.6  Weight in (lb) to have BMI = 25: 149.9]    Physical Exam  Vitals reviewed.   Constitutional:       General: She is not in acute distress.     Appearance: Normal appearance. She is normal weight. She is not ill-appearing.   HENT:      " Head: Normocephalic.      Right Ear: Tympanic membrane normal.      Left Ear: Tympanic membrane normal.      Nose: Nose normal.      Mouth/Throat:      Mouth: Mucous membranes are moist.      Pharynx: Oropharynx is clear.   Eyes:      Extraocular Movements: Extraocular movements intact.      Conjunctiva/sclera: Conjunctivae normal.      Pupils: Pupils are equal, round, and reactive to light.   Cardiovascular:      Rate and Rhythm: Normal rate and regular rhythm.      Pulses: Normal pulses.      Heart sounds: Normal heart sounds.   Pulmonary:      Effort: Pulmonary effort is normal.      Breath sounds: Normal breath sounds.   Abdominal:      General: Abdomen is flat. Bowel sounds are normal. There is no distension.      Palpations: Abdomen is soft.      Tenderness: There is no abdominal tenderness.   Musculoskeletal:         General: Normal range of motion.      Cervical back: Normal range of motion and neck supple.      Comments: ttp and spasm to right neck and occiput   Skin:     General: Skin is warm and dry.      Capillary Refill: Capillary refill takes less than 2 seconds.      Findings: No rash.   Neurological:      General: No focal deficit present.      Mental Status: She is alert and oriented to person, place, and time.      Motor: No weakness.      Gait: Gait normal.   Psychiatric:         Mood and Affect: Mood normal.         Behavior: Behavior normal.         Thought Content: Thought content normal.         Judgment: Judgment normal.          Laboratory:  CBC:  No results for input(s): WBC, RBC, HGB, HCT, PLT, MCV, MCH, MCHC in the last 2160 hours.  CMP:  No results for input(s): GLU, CALCIUM, ALBUMIN, PROT, NA, K, CO2, CL, BUN, ALKPHOS, ALT, AST, BILITOT in the last 2160 hours.    Invalid input(s): CREATININ  URINALYSIS:  Recent Labs   Lab Result Units 01/05/22  1334   Color, UA  Yellow   Specific Gravity, UA  1.020   pH, UA  7.0   Protein, UA  Negative   Nitrite, UA  Negative   Leukocytes, UA  Negative    Urobilinogen, UA EU/dL Negative      LIPIDS:  No results for input(s): TSH, HDL, CHOL, TRIG, LDLCALC, CHOLHDL, NONHDLCHOL, TOTALCHOLEST in the last 2160 hours.  TSH:  No results for input(s): TSH in the last 2160 hours.  A1C:  No results for input(s): HGBA1C in the last 2160 hours.    Radiology:      Assessment/Plan     Ludmila Jimenez is a 72 y.o.female with:    1. Tension headache  - naproxen (NAPROSYN) 500 MG tablet; Take 1 tablet (500 mg total) by mouth 2 (two) times daily as needed (pain).  Dispense: 20 tablet; Refill: 0  Chronic conditions stable.  Will continue to monitor.     Follow up as discussed    If symptoms worsen or do not improve return to clinic, go to Urgent Care or ER  Take Medications as directed      -Continue current medications and maintain follow up with specialists.         MD Isiah Lopez JrPho[facility] [place]

## 2022-04-28 ENCOUNTER — OFFICE VISIT (OUTPATIENT)
Dept: FAMILY MEDICINE | Facility: CLINIC | Age: 72
End: 2022-04-28
Payer: MEDICARE

## 2022-04-28 VITALS
DIASTOLIC BLOOD PRESSURE: 70 MMHG | WEIGHT: 240.31 LBS | HEART RATE: 88 BPM | OXYGEN SATURATION: 97 % | SYSTOLIC BLOOD PRESSURE: 120 MMHG | BODY MASS INDEX: 40.04 KG/M2 | HEIGHT: 65 IN

## 2022-04-28 DIAGNOSIS — F32.0 MAJOR DEPRESSIVE DISORDER, SINGLE EPISODE, MILD: ICD-10-CM

## 2022-04-28 DIAGNOSIS — E66.09 CLASS 2 OBESITY DUE TO EXCESS CALORIES WITHOUT SERIOUS COMORBIDITY WITH BODY MASS INDEX (BMI) OF 39.0 TO 39.9 IN ADULT: ICD-10-CM

## 2022-04-28 DIAGNOSIS — I10 PRIMARY HYPERTENSION: Primary | Chronic | ICD-10-CM

## 2022-04-28 DIAGNOSIS — J06.9 UPPER RESPIRATORY TRACT INFECTION, UNSPECIFIED TYPE: ICD-10-CM

## 2022-04-28 PROBLEM — E66.812 CLASS 2 OBESITY DUE TO EXCESS CALORIES WITHOUT SERIOUS COMORBIDITY WITH BODY MASS INDEX (BMI) OF 39.0 TO 39.9 IN ADULT: Chronic | Status: ACTIVE | Noted: 2022-04-28

## 2022-04-28 PROBLEM — E66.812 CLASS 2 OBESITY DUE TO EXCESS CALORIES WITHOUT SERIOUS COMORBIDITY WITH BODY MASS INDEX (BMI) OF 39.0 TO 39.9 IN ADULT: Status: ACTIVE | Noted: 2022-04-28

## 2022-04-28 PROBLEM — E66.01 MORBID OBESITY WITH BODY MASS INDEX OF 40.0-44.9 IN ADULT: Chronic | Status: RESOLVED | Noted: 2020-12-22 | Resolved: 2022-04-28

## 2022-04-28 LAB
CTP QC/QA: YES
SARS-COV-2 RDRP RESP QL NAA+PROBE: NEGATIVE

## 2022-04-28 PROCEDURE — 1160F PR REVIEW ALL MEDS BY PRESCRIBER/CLIN PHARMACIST DOCUMENTED: ICD-10-PCS | Mod: CPTII,S$GLB,, | Performed by: FAMILY MEDICINE

## 2022-04-28 PROCEDURE — 99499 RISK ADDL DX/OHS AUDIT: ICD-10-PCS | Mod: HCNC,S$GLB,, | Performed by: FAMILY MEDICINE

## 2022-04-28 PROCEDURE — 3078F DIAST BP <80 MM HG: CPT | Mod: CPTII,S$GLB,, | Performed by: FAMILY MEDICINE

## 2022-04-28 PROCEDURE — 3078F PR MOST RECENT DIASTOLIC BLOOD PRESSURE < 80 MM HG: ICD-10-PCS | Mod: CPTII,S$GLB,, | Performed by: FAMILY MEDICINE

## 2022-04-28 PROCEDURE — U0002: ICD-10-PCS | Mod: QW,S$GLB,, | Performed by: FAMILY MEDICINE

## 2022-04-28 PROCEDURE — 3288F FALL RISK ASSESSMENT DOCD: CPT | Mod: CPTII,S$GLB,, | Performed by: FAMILY MEDICINE

## 2022-04-28 PROCEDURE — 1159F PR MEDICATION LIST DOCUMENTED IN MEDICAL RECORD: ICD-10-PCS | Mod: CPTII,S$GLB,, | Performed by: FAMILY MEDICINE

## 2022-04-28 PROCEDURE — 99214 PR OFFICE/OUTPT VISIT, EST, LEVL IV, 30-39 MIN: ICD-10-PCS | Mod: S$GLB,,, | Performed by: FAMILY MEDICINE

## 2022-04-28 PROCEDURE — 1101F PR PT FALLS ASSESS DOC 0-1 FALLS W/OUT INJ PAST YR: ICD-10-PCS | Mod: CPTII,S$GLB,, | Performed by: FAMILY MEDICINE

## 2022-04-28 PROCEDURE — 99999 PR PBB SHADOW E&M-EST. PATIENT-LVL III: ICD-10-PCS | Mod: PBBFAC,,, | Performed by: FAMILY MEDICINE

## 2022-04-28 PROCEDURE — 1126F PR PAIN SEVERITY QUANTIFIED, NO PAIN PRESENT: ICD-10-PCS | Mod: CPTII,S$GLB,, | Performed by: FAMILY MEDICINE

## 2022-04-28 PROCEDURE — 3008F PR BODY MASS INDEX (BMI) DOCUMENTED: ICD-10-PCS | Mod: CPTII,S$GLB,, | Performed by: FAMILY MEDICINE

## 2022-04-28 PROCEDURE — 3288F PR FALLS RISK ASSESSMENT DOCUMENTED: ICD-10-PCS | Mod: CPTII,S$GLB,, | Performed by: FAMILY MEDICINE

## 2022-04-28 PROCEDURE — 3008F BODY MASS INDEX DOCD: CPT | Mod: CPTII,S$GLB,, | Performed by: FAMILY MEDICINE

## 2022-04-28 PROCEDURE — 3074F SYST BP LT 130 MM HG: CPT | Mod: CPTII,S$GLB,, | Performed by: FAMILY MEDICINE

## 2022-04-28 PROCEDURE — 99999 PR PBB SHADOW E&M-EST. PATIENT-LVL III: CPT | Mod: PBBFAC,,, | Performed by: FAMILY MEDICINE

## 2022-04-28 PROCEDURE — 1126F AMNT PAIN NOTED NONE PRSNT: CPT | Mod: CPTII,S$GLB,, | Performed by: FAMILY MEDICINE

## 2022-04-28 PROCEDURE — 99499 UNLISTED E&M SERVICE: CPT | Mod: HCNC,S$GLB,, | Performed by: FAMILY MEDICINE

## 2022-04-28 PROCEDURE — 3074F PR MOST RECENT SYSTOLIC BLOOD PRESSURE < 130 MM HG: ICD-10-PCS | Mod: CPTII,S$GLB,, | Performed by: FAMILY MEDICINE

## 2022-04-28 PROCEDURE — 1159F MED LIST DOCD IN RCRD: CPT | Mod: CPTII,S$GLB,, | Performed by: FAMILY MEDICINE

## 2022-04-28 PROCEDURE — U0002 COVID-19 LAB TEST NON-CDC: HCPCS | Mod: QW,S$GLB,, | Performed by: FAMILY MEDICINE

## 2022-04-28 PROCEDURE — 1101F PT FALLS ASSESS-DOCD LE1/YR: CPT | Mod: CPTII,S$GLB,, | Performed by: FAMILY MEDICINE

## 2022-04-28 PROCEDURE — 99214 OFFICE O/P EST MOD 30 MIN: CPT | Mod: S$GLB,,, | Performed by: FAMILY MEDICINE

## 2022-04-28 PROCEDURE — 1160F RVW MEDS BY RX/DR IN RCRD: CPT | Mod: CPTII,S$GLB,, | Performed by: FAMILY MEDICINE

## 2022-04-28 RX ORDER — AZELASTINE 1 MG/ML
1 SPRAY, METERED NASAL 2 TIMES DAILY
Qty: 30 ML | Refills: 0 | Status: SHIPPED | OUTPATIENT
Start: 2022-04-28 | End: 2023-11-08

## 2022-04-28 NOTE — PROGRESS NOTES
EST PATIENT VISIT FAMILY MEDICINE    CC:   Chief Complaint   Patient presents with    Hypertension    Cough    Sinus Problem    Nasal Congestion       HPI: Ludmila Jimenez  is a 72 y.o. female:    Patient is known to me. She presents for routine f/u. She is taking her medications as prescribed and tolerating these well. For past 2-3 days she has noted sinus congestion, cough, sore throat. No fever or dyspnea. Notes grandchildren had URI symptoms recently as well. Does not want to try flonase again for congestion. She has tried this in the past and feels it did not work.     ROS: Review of Systems   Constitutional: Negative for fever.   HENT: Positive for congestion and sore throat.    Respiratory: Negative for shortness of breath.    Cardiovascular: Negative for chest pain.       PMHX:   Past Medical History:   Diagnosis Date    High cholesterol     Hypertension        PSHX:   Past Surgical History:   Procedure Laterality Date    COLONOSCOPY  02/27/2020    One 4 mm polyp in the ascending colon.Repeat colonoscopy  in 7-10 years.    HYSTERECTOMY         FAMHX: No family history on file.    SOCHX:   Social History     Socioeconomic History    Marital status:    Tobacco Use    Smoking status: Never Smoker    Smokeless tobacco: Never Used   Substance and Sexual Activity    Alcohol use: Not Currently     Alcohol/week: 1.0 standard drink     Types: 1 Glasses of wine per week     Comment: very seldom     Drug use: No    Sexual activity: Not Currently       ALLERGIES: Review of patient's allergies indicates:  No Known Allergies    MEDS:   Current Outpatient Medications:     aspirin (ECOTRIN) 81 MG EC tablet, Take 81 mg by mouth once daily., Disp: , Rfl:     atorvastatin (LIPITOR) 10 MG tablet, Take 1 tablet (10 mg total) by mouth once daily., Disp: 90 tablet, Rfl: 3    diclofenac sodium (VOLTAREN ARTHRITIS PAIN) 1 % Gel, Apply 2 g topically once daily., Disp: 50 g, Rfl: 0    hydroCHLOROthiazide  "(HYDRODIURIL) 25 MG tablet, Take 1 tablet (25 mg total) by mouth once daily., Disp: 90 tablet, Rfl: 3    metoprolol succinate (TOPROL-XL) 50 MG 24 hr tablet, Take 1 tablet (50 mg total) by mouth once daily., Disp: 90 tablet, Rfl: 3    azelastine (ASTELIN) 137 mcg (0.1 %) nasal spray, 1 spray (137 mcg total) by Nasal route 2 (two) times daily., Disp: 30 mL, Rfl: 0    OBJECTIVE:   Vitals:    04/28/22 1316   BP: 120/70   BP Location: Left arm   Patient Position: Sitting   BP Method: Large (Manual)   Pulse: 88   SpO2: 97%   Weight: 109 kg (240 lb 4.8 oz)   Height: 5' 5" (1.651 m)     Body mass index is 39.99 kg/m².      Physical Exam  Vitals and nursing note reviewed.   Constitutional:       Appearance: Normal appearance.   HENT:      Head: Normocephalic.      Right Ear: Tympanic membrane, ear canal and external ear normal. There is no impacted cerumen.      Left Ear: Tympanic membrane, ear canal and external ear normal. There is no impacted cerumen.      Nose: Congestion present.      Mouth/Throat:      Pharynx: No oropharyngeal exudate or posterior oropharyngeal erythema.   Eyes:      General:         Right eye: No discharge.         Left eye: No discharge.      Extraocular Movements: Extraocular movements intact.   Cardiovascular:      Rate and Rhythm: Normal rate and regular rhythm.   Pulmonary:      Effort: Pulmonary effort is normal.      Breath sounds: Normal breath sounds.   Skin:     Comments: No obvious rash on exposed skin   Neurological:      Mental Status: She is alert.   Psychiatric:         Behavior: Behavior normal.           LABS:   A1C:      CBC:  Recent Labs   Lab 10/28/21  1436   WBC 6.21   RBC 4.49   Hemoglobin 12.1   Hematocrit 39.5   Platelets 331   MCV 88   MCH 26.9 L   MCHC 30.6 L     CMP:  Recent Labs   Lab 10/28/21  1436   Glucose 90   Calcium 9.7   Albumin 4.5   Total Protein 8.3   Sodium 140   Potassium 4.1   CO2 30 H   Chloride 100   BUN 13   Creatinine 0.84   Alkaline Phosphatase 115 "   ALT 16   AST 25   Total Bilirubin 0.4     LIPIDS:  Recent Labs   Lab 12/11/20  0959 10/28/21  1436   TSH 0.682  --    HDL 51 56   Cholesterol 157 179   Triglycerides 90 113   LDL Cholesterol 88.0 100.4   HDL/Cholesterol Ratio 32.5 31.3   Non-HDL Cholesterol 106 123   Total Cholesterol/HDL Ratio 3.1 3.2     TSH:  Recent Labs   Lab 12/11/20  0959   TSH 0.682         ASSESSMENT & PLAN:    Problem List Items Addressed This Visit        Psychiatric    Major depressive disorder, single episode, mild (Chronic)    Overview     Currently asymptomatic without medication              Cardiac/Vascular    Hypertension - Primary (Chronic)    Overview     Well controlled. Continue current regimen             Relevant Orders    CBC Auto Differential    Comprehensive Metabolic Panel    Lipid Panel       Endocrine    Class 2 obesity due to excess calories without serious comorbidity with body mass index (BMI) of 39.0 to 39.9 in adult (Chronic)    Overview     The patient is asked to make an attempt to improve diet and exercise patterns to aid in medical management of this problem.             Other Visit Diagnoses     Upper respiratory tract infection, unspecified type        Relevant Orders    POCT COVID-19 Rapid Screening            Follow up in about 6 months (around 10/28/2022) for blood pressure.      RTC/ED precautions discussed where applicable.   Encouraged patient to let me know if there are any further questions/concerns.     Advise patient/caretaker to check with insurance regarding orders to avoid unexpected fees/costs.     The patient/caretaker indicates understanding of these issues and agrees with the plan.    Dr. Gay Langford MD  Family Medicine

## 2022-05-11 ENCOUNTER — TELEPHONE (OUTPATIENT)
Dept: FAMILY MEDICINE | Facility: CLINIC | Age: 72
End: 2022-05-11
Payer: MEDICARE

## 2022-05-11 NOTE — TELEPHONE ENCOUNTER
----- Message from Lisa Arshad sent at 5/11/2022  2:05 PM CDT -----  Contact: patient/ [phone]  Patient calling to ask if her handicap paperwork is complete   Please advise

## 2022-06-24 ENCOUNTER — OFFICE VISIT (OUTPATIENT)
Dept: OPTOMETRY | Facility: CLINIC | Age: 72
End: 2022-06-24
Payer: MEDICARE

## 2022-06-24 DIAGNOSIS — H52.4 HYPEROPIA OF BOTH EYES WITH REGULAR ASTIGMATISM AND PRESBYOPIA: ICD-10-CM

## 2022-06-24 DIAGNOSIS — H52.223 HYPEROPIA OF BOTH EYES WITH REGULAR ASTIGMATISM AND PRESBYOPIA: ICD-10-CM

## 2022-06-24 DIAGNOSIS — Z01.00 COMPLETE EYE EXAM, ENCOUNTER FOR: ICD-10-CM

## 2022-06-24 DIAGNOSIS — H25.813 COMBINED FORMS OF AGE-RELATED CATARACT OF BOTH EYES: Primary | ICD-10-CM

## 2022-06-24 DIAGNOSIS — H04.123 DRY EYE SYNDROME OF BOTH EYES: ICD-10-CM

## 2022-06-24 DIAGNOSIS — H52.03 HYPEROPIA OF BOTH EYES WITH REGULAR ASTIGMATISM AND PRESBYOPIA: ICD-10-CM

## 2022-06-24 PROCEDURE — 99999 PR PBB SHADOW E&M-EST. PATIENT-LVL II: CPT | Mod: PBBFAC,,, | Performed by: OPTOMETRIST

## 2022-06-24 PROCEDURE — 1159F MED LIST DOCD IN RCRD: CPT | Mod: CPTII,S$GLB,, | Performed by: OPTOMETRIST

## 2022-06-24 PROCEDURE — 3288F PR FALLS RISK ASSESSMENT DOCUMENTED: ICD-10-PCS | Mod: CPTII,S$GLB,, | Performed by: OPTOMETRIST

## 2022-06-24 PROCEDURE — 3288F FALL RISK ASSESSMENT DOCD: CPT | Mod: CPTII,S$GLB,, | Performed by: OPTOMETRIST

## 2022-06-24 PROCEDURE — 92015 PR REFRACTION: ICD-10-PCS | Mod: S$GLB,,, | Performed by: OPTOMETRIST

## 2022-06-24 PROCEDURE — 92015 DETERMINE REFRACTIVE STATE: CPT | Mod: S$GLB,,, | Performed by: OPTOMETRIST

## 2022-06-24 PROCEDURE — 1126F AMNT PAIN NOTED NONE PRSNT: CPT | Mod: CPTII,S$GLB,, | Performed by: OPTOMETRIST

## 2022-06-24 PROCEDURE — 92004 COMPRE OPH EXAM NEW PT 1/>: CPT | Mod: S$GLB,,, | Performed by: OPTOMETRIST

## 2022-06-24 PROCEDURE — 1101F PR PT FALLS ASSESS DOC 0-1 FALLS W/OUT INJ PAST YR: ICD-10-PCS | Mod: CPTII,S$GLB,, | Performed by: OPTOMETRIST

## 2022-06-24 PROCEDURE — 99999 PR PBB SHADOW E&M-EST. PATIENT-LVL II: ICD-10-PCS | Mod: PBBFAC,,, | Performed by: OPTOMETRIST

## 2022-06-24 PROCEDURE — 1101F PT FALLS ASSESS-DOCD LE1/YR: CPT | Mod: CPTII,S$GLB,, | Performed by: OPTOMETRIST

## 2022-06-24 PROCEDURE — 1126F PR PAIN SEVERITY QUANTIFIED, NO PAIN PRESENT: ICD-10-PCS | Mod: CPTII,S$GLB,, | Performed by: OPTOMETRIST

## 2022-06-24 PROCEDURE — 1159F PR MEDICATION LIST DOCUMENTED IN MEDICAL RECORD: ICD-10-PCS | Mod: CPTII,S$GLB,, | Performed by: OPTOMETRIST

## 2022-06-24 PROCEDURE — 92004 PR EYE EXAM, NEW PATIENT,COMPREHESV: ICD-10-PCS | Mod: S$GLB,,, | Performed by: OPTOMETRIST

## 2022-06-24 NOTE — PROGRESS NOTES
HPI     Presents today to establish ocular health care.   Pt reports noticeable vision changes and dryness OU.  Pt misplaced most recent prescription--has reverted back to an older pair   of glasses.  Pt denies eye pain, flashes, and floaters.  Using lubricating eye drops occasionally.      Last edited by Tamika Estrada MA on 6/24/2022  3:05 PM. (History)            Assessment /Plan     For exam results, see Encounter Report.    Combined forms of age-related cataract of both eyes    Hyperopia of both eyes with regular astigmatism and presbyopia    Complete eye exam, encounter for    Dry eye syndrome of both eyes      MONITOR. ED PT ON ALL EXAM FINDINGS  RX FINAL SPECS   RTC 1 YR///PRN FOR REE/DFE  MODERATE CATS OU; UV PROTECTION; NO SURGICAL INTERVENTION NEEDED AT THIS TIME  AT'S PRN

## 2022-06-29 ENCOUNTER — OFFICE VISIT (OUTPATIENT)
Dept: FAMILY MEDICINE | Facility: CLINIC | Age: 72
End: 2022-06-29
Payer: MEDICARE

## 2022-06-29 VITALS
OXYGEN SATURATION: 97 % | HEART RATE: 73 BPM | WEIGHT: 245.5 LBS | HEIGHT: 65 IN | SYSTOLIC BLOOD PRESSURE: 130 MMHG | DIASTOLIC BLOOD PRESSURE: 82 MMHG | BODY MASS INDEX: 40.9 KG/M2

## 2022-06-29 DIAGNOSIS — M25.572 LEFT ANKLE PAIN, UNSPECIFIED CHRONICITY: Primary | ICD-10-CM

## 2022-06-29 PROCEDURE — 3075F SYST BP GE 130 - 139MM HG: CPT | Mod: CPTII,S$GLB,,

## 2022-06-29 PROCEDURE — 1125F PR PAIN SEVERITY QUANTIFIED, PAIN PRESENT: ICD-10-PCS | Mod: CPTII,S$GLB,,

## 2022-06-29 PROCEDURE — 3079F DIAST BP 80-89 MM HG: CPT | Mod: CPTII,S$GLB,,

## 2022-06-29 PROCEDURE — 1125F AMNT PAIN NOTED PAIN PRSNT: CPT | Mod: CPTII,S$GLB,,

## 2022-06-29 PROCEDURE — 3079F PR MOST RECENT DIASTOLIC BLOOD PRESSURE 80-89 MM HG: ICD-10-PCS | Mod: CPTII,S$GLB,,

## 2022-06-29 PROCEDURE — 3008F PR BODY MASS INDEX (BMI) DOCUMENTED: ICD-10-PCS | Mod: CPTII,S$GLB,,

## 2022-06-29 PROCEDURE — 1159F MED LIST DOCD IN RCRD: CPT | Mod: CPTII,S$GLB,,

## 2022-06-29 PROCEDURE — 99999 PR PBB SHADOW E&M-EST. PATIENT-LVL IV: CPT | Mod: PBBFAC,,,

## 2022-06-29 PROCEDURE — 1160F RVW MEDS BY RX/DR IN RCRD: CPT | Mod: CPTII,S$GLB,,

## 2022-06-29 PROCEDURE — 3075F PR MOST RECENT SYSTOLIC BLOOD PRESS GE 130-139MM HG: ICD-10-PCS | Mod: CPTII,S$GLB,,

## 2022-06-29 PROCEDURE — 1101F PT FALLS ASSESS-DOCD LE1/YR: CPT | Mod: CPTII,S$GLB,,

## 2022-06-29 PROCEDURE — 1101F PR PT FALLS ASSESS DOC 0-1 FALLS W/OUT INJ PAST YR: ICD-10-PCS | Mod: CPTII,S$GLB,,

## 2022-06-29 PROCEDURE — 99213 OFFICE O/P EST LOW 20 MIN: CPT | Mod: S$GLB,,,

## 2022-06-29 PROCEDURE — 99999 PR PBB SHADOW E&M-EST. PATIENT-LVL IV: ICD-10-PCS | Mod: PBBFAC,,,

## 2022-06-29 PROCEDURE — 3288F FALL RISK ASSESSMENT DOCD: CPT | Mod: CPTII,S$GLB,,

## 2022-06-29 PROCEDURE — 1159F PR MEDICATION LIST DOCUMENTED IN MEDICAL RECORD: ICD-10-PCS | Mod: CPTII,S$GLB,,

## 2022-06-29 PROCEDURE — 1160F PR REVIEW ALL MEDS BY PRESCRIBER/CLIN PHARMACIST DOCUMENTED: ICD-10-PCS | Mod: CPTII,S$GLB,,

## 2022-06-29 PROCEDURE — 3008F BODY MASS INDEX DOCD: CPT | Mod: CPTII,S$GLB,,

## 2022-06-29 PROCEDURE — 99213 PR OFFICE/OUTPT VISIT, EST, LEVL III, 20-29 MIN: ICD-10-PCS | Mod: S$GLB,,,

## 2022-06-29 PROCEDURE — 3288F PR FALLS RISK ASSESSMENT DOCUMENTED: ICD-10-PCS | Mod: CPTII,S$GLB,,

## 2022-06-29 RX ORDER — NAPROXEN 500 MG/1
500 TABLET ORAL 2 TIMES DAILY WITH MEALS
Qty: 20 TABLET | Refills: 0 | Status: SHIPPED | OUTPATIENT
Start: 2022-06-29 | End: 2022-07-11

## 2022-06-29 NOTE — PROGRESS NOTES
Subjective:      Patient ID: Ludmila Jimenez is a 72 y.o. female.    Chief Complaint: Ankle Pain    Ludmila Jimenez  is a 72 y.o female patient of Dr. Langford with hypertension, hyperlipidemia, unknown to me, presents for left ankle pain. Reports pain has been present off and on for a month. Denies injury. When pain is bad, she is unable to do anything.     Ankle Pain   The incident occurred more than 1 week ago (a few weeks off and on; occurs in the morning when getting out of bed ). There was no injury mechanism. The pain is present in the left ankle. The quality of the pain is described as aching. The pain is at a severity of 3/10. The pain is mild. The pain has been fluctuating (comes and goes, not constant) since onset. Pertinent negatives include no inability to bear weight, loss of motion, loss of sensation, muscle weakness, numbness or tingling. She reports no foreign bodies present. Nothing (when it happens, it happens for a while then it is gone. comes back for a while but does not stay 24 hours, never know when its going to come. lasts for a few minutes) aggravates the symptoms. She has tried acetaminophen and NSAIDs for the symptoms. Improvement on treatment: unsure if it provides relief.         Review of Systems   Constitutional: Negative for appetite change and fatigue.   HENT: Negative for congestion, hearing loss, postnasal drip, rhinorrhea, sinus pressure, sinus pain, sneezing and sore throat.    Eyes: Negative for pain and visual disturbance.   Respiratory: Negative.  Negative for cough and shortness of breath.    Cardiovascular: Negative for chest pain and leg swelling.   Gastrointestinal: Negative for abdominal pain, constipation, diarrhea, nausea and vomiting.   Endocrine: Negative.    Genitourinary: Negative for decreased urine volume, difficulty urinating, dysuria, frequency, hematuria and urgency.   Musculoskeletal: Positive for arthralgias and myalgias. Negative for gait problem.   Skin: Negative  "for color change.   Neurological: Negative for dizziness, tingling, syncope, weakness, light-headedness, numbness and headaches.   Psychiatric/Behavioral: Negative.         Objective:     Vitals:    06/29/22 0846   BP: 130/82   Pulse: 73   SpO2: 97%   Weight: 111.4 kg (245 lb 8 oz)   Height: 5' 5" (1.651 m)   PainSc:   3   PainLoc: Ankle      Physical Exam  Vitals reviewed.   Constitutional:       Appearance: Normal appearance. She is well-developed and well-groomed.   HENT:      Head: Normocephalic and atraumatic.   Eyes:      Pupils: Pupils are equal, round, and reactive to light.   Cardiovascular:      Rate and Rhythm: Normal rate and regular rhythm.      Pulses:           Dorsalis pedis pulses are 2+ on the right side and 2+ on the left side.      Heart sounds: Normal heart sounds.   Pulmonary:      Effort: Pulmonary effort is normal.      Breath sounds: Normal breath sounds. No wheezing, rhonchi or rales.   Musculoskeletal:         General: Normal range of motion.      Cervical back: Normal range of motion.      Right lower leg: No edema.      Left lower leg: No edema.      Right ankle: No swelling or deformity. No tenderness. Normal range of motion. Normal pulse.      Right Achilles Tendon: No tenderness or defects. Alvarez's test negative.      Left ankle: No swelling or deformity. Normal range of motion. Normal pulse.      Left Achilles Tendon: No tenderness or defects. Alvarez's test negative.   Skin:     General: Skin is warm and dry.      Capillary Refill: Capillary refill takes less than 2 seconds.   Neurological:      General: No focal deficit present.      Mental Status: She is alert and oriented to person, place, and time.   Psychiatric:         Attention and Perception: Attention normal.         Mood and Affect: Mood normal.         Speech: Speech normal.         Behavior: Behavior is cooperative.        Assessment:         1. Left ankle pain, unspecified chronicity          Plan:   1. Left ankle " pain, unspecified chronicity  - naproxen (NAPROSYN) 500 MG tablet; Take 1 tablet (500 mg total) by mouth 2 (two) times daily with meals. for 10 days  Dispense: 20 tablet; Refill: 0  - X-Ray Ankle Complete Left; Future       -Labs reviewed for proper medication dosing   -X-ray left foot shows osseous spur at the Achilles and plantar fascia attachment to the calcaneus. Results discussed with patient.   -Wrap with ace wrap, rest, ice or cold water for 15-20 minutes every 2-3 hours while awake as needed for pain   -Physical therapy discussed, declined at this time  -incorporate exercise and diet modification to assist with weight loss   -will notify of results of x-ray  If symptoms worsen, go to ER.   If symptoms fail to improve, follow up with PCP.     Patient verbalizes understanding and agrees with current treatment plan.      Ana Hart NP    [facility]   6/29/22

## 2022-10-28 ENCOUNTER — OFFICE VISIT (OUTPATIENT)
Dept: FAMILY MEDICINE | Facility: CLINIC | Age: 72
End: 2022-10-28
Payer: MEDICARE

## 2022-10-28 VITALS
DIASTOLIC BLOOD PRESSURE: 72 MMHG | SYSTOLIC BLOOD PRESSURE: 122 MMHG | WEIGHT: 240.5 LBS | HEIGHT: 65 IN | HEART RATE: 63 BPM | BODY MASS INDEX: 40.07 KG/M2 | OXYGEN SATURATION: 96 %

## 2022-10-28 DIAGNOSIS — Z23 NEED FOR TDAP VACCINATION: ICD-10-CM

## 2022-10-28 DIAGNOSIS — Z12.31 BREAST CANCER SCREENING BY MAMMOGRAM: ICD-10-CM

## 2022-10-28 DIAGNOSIS — E66.01 MORBID OBESITY WITH BODY MASS INDEX OF 40.0-44.9 IN ADULT: Chronic | ICD-10-CM

## 2022-10-28 DIAGNOSIS — I10 PRIMARY HYPERTENSION: Primary | Chronic | ICD-10-CM

## 2022-10-28 DIAGNOSIS — Z23 FLU VACCINE NEED: ICD-10-CM

## 2022-10-28 DIAGNOSIS — E78.2 MIXED HYPERLIPIDEMIA: ICD-10-CM

## 2022-10-28 PROCEDURE — 3074F SYST BP LT 130 MM HG: CPT | Mod: CPTII,S$GLB,,

## 2022-10-28 PROCEDURE — G0008 FLU VACCINE - QUADRIVALENT - ADJUVANTED: ICD-10-PCS | Mod: S$GLB,,,

## 2022-10-28 PROCEDURE — 99999 PR PBB SHADOW E&M-EST. PATIENT-LVL IV: CPT | Mod: PBBFAC,,,

## 2022-10-28 PROCEDURE — 90694 VACC AIIV4 NO PRSRV 0.5ML IM: CPT | Mod: S$GLB,,,

## 2022-10-28 PROCEDURE — 90694 FLU VACCINE - QUADRIVALENT - ADJUVANTED: ICD-10-PCS | Mod: S$GLB,,,

## 2022-10-28 PROCEDURE — 99999 PR PBB SHADOW E&M-EST. PATIENT-LVL IV: ICD-10-PCS | Mod: PBBFAC,,,

## 2022-10-28 PROCEDURE — 99499 RISK ADDL DX/OHS AUDIT: ICD-10-PCS | Mod: HCNC,S$GLB,,

## 2022-10-28 PROCEDURE — 99499 UNLISTED E&M SERVICE: CPT | Mod: HCNC,S$GLB,,

## 2022-10-28 PROCEDURE — 1159F MED LIST DOCD IN RCRD: CPT | Mod: CPTII,S$GLB,,

## 2022-10-28 PROCEDURE — 99214 PR OFFICE/OUTPT VISIT, EST, LEVL IV, 30-39 MIN: ICD-10-PCS | Mod: S$GLB,,,

## 2022-10-28 PROCEDURE — 99214 OFFICE O/P EST MOD 30 MIN: CPT | Mod: S$GLB,,,

## 2022-10-28 PROCEDURE — 3288F PR FALLS RISK ASSESSMENT DOCUMENTED: ICD-10-PCS | Mod: CPTII,S$GLB,,

## 2022-10-28 PROCEDURE — 3288F FALL RISK ASSESSMENT DOCD: CPT | Mod: CPTII,S$GLB,,

## 2022-10-28 PROCEDURE — 1126F AMNT PAIN NOTED NONE PRSNT: CPT | Mod: CPTII,S$GLB,,

## 2022-10-28 PROCEDURE — 1126F PR PAIN SEVERITY QUANTIFIED, NO PAIN PRESENT: ICD-10-PCS | Mod: CPTII,S$GLB,,

## 2022-10-28 PROCEDURE — 1160F PR REVIEW ALL MEDS BY PRESCRIBER/CLIN PHARMACIST DOCUMENTED: ICD-10-PCS | Mod: CPTII,S$GLB,,

## 2022-10-28 PROCEDURE — 1159F PR MEDICATION LIST DOCUMENTED IN MEDICAL RECORD: ICD-10-PCS | Mod: CPTII,S$GLB,,

## 2022-10-28 PROCEDURE — 3078F PR MOST RECENT DIASTOLIC BLOOD PRESSURE < 80 MM HG: ICD-10-PCS | Mod: CPTII,S$GLB,,

## 2022-10-28 PROCEDURE — G0008 ADMIN INFLUENZA VIRUS VAC: HCPCS | Mod: S$GLB,,,

## 2022-10-28 PROCEDURE — 3078F DIAST BP <80 MM HG: CPT | Mod: CPTII,S$GLB,,

## 2022-10-28 PROCEDURE — 3074F PR MOST RECENT SYSTOLIC BLOOD PRESSURE < 130 MM HG: ICD-10-PCS | Mod: CPTII,S$GLB,,

## 2022-10-28 PROCEDURE — 1160F RVW MEDS BY RX/DR IN RCRD: CPT | Mod: CPTII,S$GLB,,

## 2022-10-28 PROCEDURE — 1101F PT FALLS ASSESS-DOCD LE1/YR: CPT | Mod: CPTII,S$GLB,,

## 2022-10-28 PROCEDURE — 1101F PR PT FALLS ASSESS DOC 0-1 FALLS W/OUT INJ PAST YR: ICD-10-PCS | Mod: CPTII,S$GLB,,

## 2022-10-28 RX ORDER — METOPROLOL SUCCINATE 50 MG/1
50 TABLET, EXTENDED RELEASE ORAL DAILY
Qty: 90 TABLET | Refills: 3 | Status: SHIPPED | OUTPATIENT
Start: 2022-10-28 | End: 2023-05-03 | Stop reason: SDUPTHER

## 2022-10-28 RX ORDER — HYDROCHLOROTHIAZIDE 25 MG/1
25 TABLET ORAL DAILY
Qty: 90 TABLET | Refills: 3 | Status: SHIPPED | OUTPATIENT
Start: 2022-10-28 | End: 2023-05-03 | Stop reason: SDUPTHER

## 2022-10-28 RX ORDER — ATORVASTATIN CALCIUM 10 MG/1
10 TABLET, FILM COATED ORAL DAILY
Qty: 90 TABLET | Refills: 3 | Status: SHIPPED | OUTPATIENT
Start: 2022-10-28 | End: 2023-05-03 | Stop reason: SDUPTHER

## 2022-10-28 NOTE — PROGRESS NOTES
"Subjective:         Chief Complaint: Follow-up and Diabetes  HPI   Ludmila Jimenez is a 72 y.o. female, patient of Gay Langford MD with hypertension, hyperlipidemia known to me, presents today for Follow-up and Diabetes  Overall feels well.   Reports compliance with medications. Has started working on diet modification. Has been walking about 1 mile twice per week for exercise.     Does not check BP at home as it has been running good. Reports compliance with medications.   Would like orders for mammogram.             Past Medical History:   Diagnosis Date    High cholesterol     Hypertension        Past Surgical History:   Procedure Laterality Date    COLONOSCOPY  02/27/2020    One 4 mm polyp in the ascending colon.Repeat colonoscopy  in 7-10 years.    HYSTERECTOMY         History reviewed. No pertinent family history.    Social History     Socioeconomic History    Marital status:    Tobacco Use    Smoking status: Never    Smokeless tobacco: Never   Substance and Sexual Activity    Alcohol use: Not Currently     Alcohol/week: 1.0 standard drink     Types: 1 Glasses of wine per week     Comment: very seldom     Drug use: No    Sexual activity: Not Currently       Review of Systems   Constitutional:  Negative for appetite change.   Respiratory:  Negative for shortness of breath.    Cardiovascular:  Negative for palpitations and leg swelling.   Gastrointestinal:  Negative for diarrhea.   Skin:  Negative for color change.   Neurological:  Negative for light-headedness.       Objective:     Vitals:    10/28/22 1024   BP: 122/72   Pulse: 63   SpO2: 96%   Weight: 109.1 kg (240 lb 8 oz)   Height: 5' 5" (1.651 m)          Physical Exam  Vitals reviewed.   Constitutional:       Appearance: Normal appearance. She is well-developed and well-groomed.   HENT:      Head: Normocephalic and atraumatic.   Eyes:      Extraocular Movements: Extraocular movements intact.   Cardiovascular:      Rate and Rhythm: Normal rate and " regular rhythm.      Heart sounds: Normal heart sounds.   Pulmonary:      Effort: Pulmonary effort is normal.      Breath sounds: Normal breath sounds.   Musculoskeletal:      Right lower leg: No edema.      Left lower leg: No edema.   Skin:     General: Skin is warm and dry.   Neurological:      General: No focal deficit present.      Mental Status: She is alert and oriented to person, place, and time.   Psychiatric:         Attention and Perception: Attention normal.         Mood and Affect: Mood normal.         Speech: Speech normal.         Behavior: Behavior is cooperative.         Laboratory:  CBC:  Recent Labs   Lab Result Units 10/25/22  0934   WBC K/uL 5.79   RBC M/uL 4.62   Hemoglobin g/dL 12.5   Hematocrit % 40.0   Platelets K/uL 346   MCV fL 87   MCH pg 27.1   MCHC g/dL 31.3*     CMP:  Recent Labs   Lab Result Units 10/25/22  0934   Glucose mg/dL 112*   Calcium mg/dL 9.2   Albumin g/dL 4.1   Total Protein g/dL 8.1   Sodium mmol/L 140   Potassium mmol/L 4.4   CO2 mmol/L 30*   Chloride mmol/L 100   BUN mg/dL 19*   Alkaline Phosphatase U/L 104   ALT U/L 16   AST U/L 20   Total Bilirubin mg/dL 0.4     URINALYSIS:  No results for input(s): COLORU, CLARITYU, SPECGRAV, PHUR, PROTEINUA, GLUCOSEU, BILIRUBINCON, BLOODU, WBCU, RBCU, BACTERIA, MUCUS, NITRITE, LEUKOCYTESUR, UROBILINOGEN, HYALINECASTS in the last 2160 hours.   LIPIDS:  Recent Labs   Lab Result Units 10/25/22  0934   HDL mg/dL 51   Cholesterol mg/dL 170   Triglycerides mg/dL 107   LDL Cholesterol mg/dL 97.6   HDL/Cholesterol Ratio % 30.0   Non-HDL Cholesterol mg/dL 119   Total Cholesterol/HDL Ratio  3.3     TSH:  No results for input(s): TSH in the last 2160 hours.  A1C:  No results for input(s): HGBA1C in the last 2160 hours.    Assessment:         ICD-10-CM ICD-9-CM   1. Primary hypertension  I10 401.9   2. Mixed hyperlipidemia  E78.2 272.2   3. Breast cancer screening by mammogram  Z12.31 V76.12   4. Flu vaccine need  Z23 V04.81   5. Need for Tdap  vaccination  Z23 V06.1   6. Morbid obesity with body mass index of 40.0-44.9 in adult  E66.01 278.01    Z68.41 V85.41       Plan:       Labs reviewed with patient. No changes required at this time.     Primary hypertension  -     metoprolol succinate (TOPROL-XL) 50 MG 24 hr tablet; Take 1 tablet (50 mg total) by mouth once daily.  Dispense: 90 tablet; Refill: 3  -     hydroCHLOROthiazide (HYDRODIURIL) 25 MG tablet; Take 1 tablet (25 mg total) by mouth once daily.  Dispense: 90 tablet; Refill: 3  Chronic; stable on medication. Continue medication as prescribed.    Mixed hyperlipidemia  -     atorvastatin (LIPITOR) 10 MG tablet; Take 1 tablet (10 mg total) by mouth once daily.  Dispense: 90 tablet; Refill: 3  Chronic; stable on medication. Continue medication as prescribed.    Breast cancer screening by mammogram  -     Mammo Digital Screening Bilat w/ Nba; Future; Expected date: 10/28/2022    Flu vaccine need  -     Influenza (FLUAD) - Quadrivalent (Adjuvanted) *Preferred* (65+) (PF)    Need for Tdap vaccination  -     diphth,pertus,acell,,tetanus (BOOSTRIX) 2.5-8-5 Lf-mcg-Lf/0.5mL Susp; Inject 0.5 mLs into the muscle once. for 1 dose  Dispense: 0.5 mL; Refill: 0  Explained this was sent to pharmacy, pharmacist will administer.     Morbid obesity with body mass index of 40.0-44.9 in adult  Continue to work on diet modification and increase in physical activity/exercise as tolerated.     If symptoms worsen, go to ER.  If symptoms do not improve, return to clinic.   Keep appointments with all specialists.     Patient verbalizes understanding and agrees with current treatment plan.    Follow up in about 6 months (around 4/28/2023), or if symptoms worsen or fail to improve, for BP follow up .     Patient's Medications   New Prescriptions    DIPHTH,PERTUS,ACELL,,TETANUS (BOOSTRIX) 2.5-8-5 LF-MCG-LF/0.5ML SUSP    Inject 0.5 mLs into the muscle once. for 1 dose   Previous Medications    ASPIRIN (ECOTRIN) 81 MG EC TABLET     Take 81 mg by mouth once daily.    AZELASTINE (ASTELIN) 137 MCG (0.1 %) NASAL SPRAY    1 spray (137 mcg total) by Nasal route 2 (two) times daily.    DICLOFENAC SODIUM (VOLTAREN ARTHRITIS PAIN) 1 % GEL    Apply 2 g topically once daily.    NAPROXEN (NAPROSYN) 500 MG TABLET    TAKE 1 TABLET 2 TIMES DAILY WITH MEALS FOR 10 DAYS   Modified Medications    Modified Medication Previous Medication    ATORVASTATIN (LIPITOR) 10 MG TABLET atorvastatin (LIPITOR) 10 MG tablet       Take 1 tablet (10 mg total) by mouth once daily.    Take 1 tablet (10 mg total) by mouth once daily.    HYDROCHLOROTHIAZIDE (HYDRODIURIL) 25 MG TABLET hydroCHLOROthiazide (HYDRODIURIL) 25 MG tablet       Take 1 tablet (25 mg total) by mouth once daily.    Take 1 tablet (25 mg total) by mouth once daily.    METOPROLOL SUCCINATE (TOPROL-XL) 50 MG 24 HR TABLET metoprolol succinate (TOPROL-XL) 50 MG 24 hr tablet       Take 1 tablet (50 mg total) by mouth once daily.    Take 1 tablet (50 mg total) by mouth once daily.   Discontinued Medications    No medications on file         Ana Hart NP

## 2022-10-28 NOTE — PATIENT INSTRUCTIONS
Continue to work on diet modification and exercise.   Tetanus has been sent to [facility], pharmacist will administer to you.

## 2023-01-03 ENCOUNTER — TELEPHONE (OUTPATIENT)
Dept: PODIATRY | Facility: CLINIC | Age: 73
End: 2023-01-03
Payer: MEDICARE

## 2023-01-03 DIAGNOSIS — M25.571 ACUTE RIGHT ANKLE PAIN: Primary | ICD-10-CM

## 2023-01-03 NOTE — TELEPHONE ENCOUNTER
----- Message from Micheal Billingsley sent at 1/3/2023  2:53 PM CST -----  .Type:  Sooner Apoointment Request    Caller is requesting a sooner appointment.  Caller declined first available appointment listed below.  Caller will not accept being placed on the waitlist and is requesting a message be sent to doctor.  Name of Caller:Pt  When is the first available appointment?N/A  Symptoms:Pain above Heel Right  Would the patient rather a call back or a response via [employer]? Call  Best Call Back Number:[phone]  Pt is requesting sooner appointment.

## 2023-04-21 ENCOUNTER — PES CALL (OUTPATIENT)
Dept: ADMINISTRATIVE | Facility: CLINIC | Age: 73
End: 2023-04-21
Payer: MEDICARE

## 2023-05-03 ENCOUNTER — OFFICE VISIT (OUTPATIENT)
Dept: FAMILY MEDICINE | Facility: CLINIC | Age: 73
End: 2023-05-03
Payer: MEDICARE

## 2023-05-03 ENCOUNTER — TELEPHONE (OUTPATIENT)
Dept: ADMINISTRATIVE | Facility: CLINIC | Age: 73
End: 2023-05-03
Payer: MEDICARE

## 2023-05-03 VITALS
DIASTOLIC BLOOD PRESSURE: 80 MMHG | HEART RATE: 82 BPM | OXYGEN SATURATION: 96 % | WEIGHT: 242.5 LBS | SYSTOLIC BLOOD PRESSURE: 130 MMHG | HEIGHT: 65 IN | BODY MASS INDEX: 40.4 KG/M2

## 2023-05-03 DIAGNOSIS — E66.01 MORBID OBESITY WITH BODY MASS INDEX OF 40.0-44.9 IN ADULT: Chronic | ICD-10-CM

## 2023-05-03 DIAGNOSIS — F32.0 MAJOR DEPRESSIVE DISORDER, SINGLE EPISODE, MILD: Chronic | ICD-10-CM

## 2023-05-03 DIAGNOSIS — R73.9 HYPERGLYCEMIA: ICD-10-CM

## 2023-05-03 DIAGNOSIS — R39.15 URINARY URGENCY: Chronic | ICD-10-CM

## 2023-05-03 DIAGNOSIS — E78.2 MIXED HYPERLIPIDEMIA: ICD-10-CM

## 2023-05-03 DIAGNOSIS — I10 PRIMARY HYPERTENSION: Primary | Chronic | ICD-10-CM

## 2023-05-03 PROCEDURE — 90677 PNEUMOCOCCAL CONJUGATE VACCINE 20-VALENT: ICD-10-PCS | Mod: HCNC,S$GLB,, | Performed by: FAMILY MEDICINE

## 2023-05-03 PROCEDURE — 1159F MED LIST DOCD IN RCRD: CPT | Mod: HCNC,CPTII,S$GLB, | Performed by: FAMILY MEDICINE

## 2023-05-03 PROCEDURE — 3008F PR BODY MASS INDEX (BMI) DOCUMENTED: ICD-10-PCS | Mod: HCNC,CPTII,S$GLB, | Performed by: FAMILY MEDICINE

## 2023-05-03 PROCEDURE — 1159F PR MEDICATION LIST DOCUMENTED IN MEDICAL RECORD: ICD-10-PCS | Mod: HCNC,CPTII,S$GLB, | Performed by: FAMILY MEDICINE

## 2023-05-03 PROCEDURE — 3075F PR MOST RECENT SYSTOLIC BLOOD PRESS GE 130-139MM HG: ICD-10-PCS | Mod: HCNC,CPTII,S$GLB, | Performed by: FAMILY MEDICINE

## 2023-05-03 PROCEDURE — 3079F DIAST BP 80-89 MM HG: CPT | Mod: HCNC,CPTII,S$GLB, | Performed by: FAMILY MEDICINE

## 2023-05-03 PROCEDURE — 3075F SYST BP GE 130 - 139MM HG: CPT | Mod: HCNC,CPTII,S$GLB, | Performed by: FAMILY MEDICINE

## 2023-05-03 PROCEDURE — 1126F AMNT PAIN NOTED NONE PRSNT: CPT | Mod: HCNC,CPTII,S$GLB, | Performed by: FAMILY MEDICINE

## 2023-05-03 PROCEDURE — 99999 PR PBB SHADOW E&M-EST. PATIENT-LVL III: ICD-10-PCS | Mod: PBBFAC,HCNC,, | Performed by: FAMILY MEDICINE

## 2023-05-03 PROCEDURE — 3288F FALL RISK ASSESSMENT DOCD: CPT | Mod: HCNC,CPTII,S$GLB, | Performed by: FAMILY MEDICINE

## 2023-05-03 PROCEDURE — G0009 PNEUMOCOCCAL CONJUGATE VACCINE 20-VALENT: ICD-10-PCS | Mod: HCNC,S$GLB,, | Performed by: FAMILY MEDICINE

## 2023-05-03 PROCEDURE — 3288F PR FALLS RISK ASSESSMENT DOCUMENTED: ICD-10-PCS | Mod: HCNC,CPTII,S$GLB, | Performed by: FAMILY MEDICINE

## 2023-05-03 PROCEDURE — G0009 ADMIN PNEUMOCOCCAL VACCINE: HCPCS | Mod: HCNC,S$GLB,, | Performed by: FAMILY MEDICINE

## 2023-05-03 PROCEDURE — 1101F PR PT FALLS ASSESS DOC 0-1 FALLS W/OUT INJ PAST YR: ICD-10-PCS | Mod: HCNC,CPTII,S$GLB, | Performed by: FAMILY MEDICINE

## 2023-05-03 PROCEDURE — 1101F PT FALLS ASSESS-DOCD LE1/YR: CPT | Mod: HCNC,CPTII,S$GLB, | Performed by: FAMILY MEDICINE

## 2023-05-03 PROCEDURE — 3079F PR MOST RECENT DIASTOLIC BLOOD PRESSURE 80-89 MM HG: ICD-10-PCS | Mod: HCNC,CPTII,S$GLB, | Performed by: FAMILY MEDICINE

## 2023-05-03 PROCEDURE — 99214 PR OFFICE/OUTPT VISIT, EST, LEVL IV, 30-39 MIN: ICD-10-PCS | Mod: HCNC,S$GLB,, | Performed by: FAMILY MEDICINE

## 2023-05-03 PROCEDURE — 3008F BODY MASS INDEX DOCD: CPT | Mod: HCNC,CPTII,S$GLB, | Performed by: FAMILY MEDICINE

## 2023-05-03 PROCEDURE — 99999 PR PBB SHADOW E&M-EST. PATIENT-LVL III: CPT | Mod: PBBFAC,HCNC,, | Performed by: FAMILY MEDICINE

## 2023-05-03 PROCEDURE — 1160F PR REVIEW ALL MEDS BY PRESCRIBER/CLIN PHARMACIST DOCUMENTED: ICD-10-PCS | Mod: HCNC,CPTII,S$GLB, | Performed by: FAMILY MEDICINE

## 2023-05-03 PROCEDURE — 90677 PCV20 VACCINE IM: CPT | Mod: HCNC,S$GLB,, | Performed by: FAMILY MEDICINE

## 2023-05-03 PROCEDURE — 1126F PR PAIN SEVERITY QUANTIFIED, NO PAIN PRESENT: ICD-10-PCS | Mod: HCNC,CPTII,S$GLB, | Performed by: FAMILY MEDICINE

## 2023-05-03 PROCEDURE — 99214 OFFICE O/P EST MOD 30 MIN: CPT | Mod: HCNC,S$GLB,, | Performed by: FAMILY MEDICINE

## 2023-05-03 PROCEDURE — 1160F RVW MEDS BY RX/DR IN RCRD: CPT | Mod: HCNC,CPTII,S$GLB, | Performed by: FAMILY MEDICINE

## 2023-05-03 RX ORDER — HYDROCHLOROTHIAZIDE 25 MG/1
25 TABLET ORAL DAILY
Qty: 90 TABLET | Refills: 3 | Status: SHIPPED | OUTPATIENT
Start: 2023-05-03 | End: 2023-11-08 | Stop reason: SDUPTHER

## 2023-05-03 RX ORDER — METOPROLOL SUCCINATE 50 MG/1
50 TABLET, EXTENDED RELEASE ORAL DAILY
Qty: 90 TABLET | Refills: 3 | Status: SHIPPED | OUTPATIENT
Start: 2023-05-03 | End: 2023-11-08 | Stop reason: SDUPTHER

## 2023-05-03 RX ORDER — ATORVASTATIN CALCIUM 10 MG/1
10 TABLET, FILM COATED ORAL DAILY
Qty: 90 TABLET | Refills: 3 | Status: SHIPPED | OUTPATIENT
Start: 2023-05-03 | End: 2023-11-08 | Stop reason: SDUPTHER

## 2023-05-03 NOTE — PROGRESS NOTES
PATIENT VISIT FAMILY MEDICINE    CC:   Chief Complaint   Patient presents with    Follow-up    Hypertension       HPI: Ludmila Jimenez  is a 73 y.o. female:    Patient is known to me.  Patient presents alone for routine follow up on chronic conditions.    Patient doing well overall, taking medications as prescribed and with no acute concerns.       PMHX:   Past Medical History:   Diagnosis Date    High cholesterol     Hypertension        PSHX:   Past Surgical History:   Procedure Laterality Date    COLONOSCOPY  02/27/2020    One 4 mm polyp in the ascending colon.Repeat colonoscopy  in 7-10 years.    HYSTERECTOMY         FAMHX: History reviewed. No pertinent family history.    SOCHX:   Social History     Socioeconomic History    Marital status:    Tobacco Use    Smoking status: Never     Passive exposure: Never    Smokeless tobacco: Never   Substance and Sexual Activity    Alcohol use: Not Currently     Alcohol/week: 1.0 standard drink     Types: 1 Glasses of wine per week     Comment: very seldom     Drug use: No    Sexual activity: Not Currently       ALLERGIES: Review of patient's allergies indicates:  No Known Allergies    MEDS:   Current Outpatient Medications:     aspirin (ECOTRIN) 81 MG EC tablet, Take 81 mg by mouth once daily., Disp: , Rfl:     naproxen (NAPROSYN) 500 MG tablet, TAKE 1 TABLET 2 TIMES DAILY WITH MEALS FOR 10 DAYS, Disp: 20 tablet, Rfl: 0    atorvastatin (LIPITOR) 10 MG tablet, Take 1 tablet (10 mg total) by mouth once daily., Disp: 90 tablet, Rfl: 3    azelastine (ASTELIN) 137 mcg (0.1 %) nasal spray, 1 spray (137 mcg total) by Nasal route 2 (two) times daily., Disp: 30 mL, Rfl: 0    blood sugar diagnostic (TRUE METRIX GLUCOSE TEST STRIP) Strp, 1 each by Misc.(Non-Drug; Combo Route) route Daily., Disp: 100 strip, Rfl: 0    blood-glucose meter (TRUE METRIX GLUCOSE METER) kit, Check glucose as directed, Disp: 1 each, Rfl: 0    hydroCHLOROthiazide (HYDRODIURIL) 25 MG tablet, Take 1 tablet  "(25 mg total) by mouth once daily., Disp: 90 tablet, Rfl: 3    lancets (TRUEPLUS LANCETS) 33 gauge Misc, 1 lancet by Misc.(Non-Drug; Combo Route) route Daily., Disp: 100 each, Rfl: 0    metoprolol succinate (TOPROL-XL) 50 MG 24 hr tablet, Take 1 tablet (50 mg total) by mouth once daily., Disp: 90 tablet, Rfl: 3    OBJECTIVE:   Vitals:    05/03/23 0929   BP: 130/80   BP Location: Left arm   Patient Position: Sitting   BP Method: Large (Manual)   Pulse: 82   SpO2: 96%   Weight: 110 kg (242 lb 8.1 oz)   Height: 5' 5" (1.651 m)     Body mass index is 40.36 kg/m².      Physical Exam  Vitals and nursing note reviewed.   Constitutional:       Appearance: Normal appearance.   HENT:      Head: Normocephalic.   Eyes:      General:         Right eye: No discharge.         Left eye: No discharge.      Extraocular Movements: Extraocular movements intact.   Cardiovascular:      Rate and Rhythm: Normal rate and regular rhythm.      Heart sounds: Normal heart sounds.   Pulmonary:      Effort: Pulmonary effort is normal.      Breath sounds: Normal breath sounds.   Skin:     Comments: No obvious rash on exposed skin   Neurological:      Mental Status: She is alert.   Psychiatric:         Behavior: Behavior normal.         LABS:   A1C:      CBC:  Recent Labs   Lab 05/03/23  1002   WBC 6.12   RBC 4.54   Hemoglobin 12.5   Hematocrit 39.7   Platelets 322   MCV 87   MCH 27.5   MCHC 31.5 L     CMP:  Recent Labs   Lab 05/03/23  1002   Glucose 106   Calcium 9.2   Albumin 4.0   Total Protein 7.9   Sodium 139   Potassium 4.1   CO2 33 H   Chloride 101   BUN 12   Creatinine 0.75   Alkaline Phosphatase 96   ALT 17   AST 21   Total Bilirubin 0.3     LIPIDS:  Recent Labs   Lab 12/11/20  0959 10/28/21  1436 10/25/22  0934   TSH 0.682  --   --    HDL 51   < > 51   Cholesterol 157   < > 170   Triglycerides 90   < > 107   LDL Cholesterol 88.0   < > 97.6   HDL/Cholesterol Ratio 32.5   < > 30.0   Non-HDL Cholesterol 106   < > 119   Total Cholesterol/HDL " Ratio 3.1   < > 3.3    < > = values in this interval not displayed.     TSH:  Recent Labs   Lab 12/11/20  0959   TSH 0.682         ASSESSMENT & PLAN:    Problem List Items Addressed This Visit          Psychiatric    Major depressive disorder, single episode, mild (Chronic)    Overview     Currently asymptomatic without medication              Cardiac/Vascular    Hypertension - Primary (Chronic)    Overview     Well controlled. Continue current regimen             Relevant Medications    hydroCHLOROthiazide (HYDRODIURIL) 25 MG tablet    metoprolol succinate (TOPROL-XL) 50 MG 24 hr tablet    Other Relevant Orders    CBC Auto Differential (Completed)    Comprehensive Metabolic Panel (Completed)    Lipid Panel    Mixed hyperlipidemia    Overview     Continue statin           Relevant Medications    atorvastatin (LIPITOR) 10 MG tablet       Renal/    Urinary urgency (Chronic)    Overview     Recommend scheduled bathroom breaks. Declines trial of medication at this time.               Endocrine    Morbid obesity with body mass index of 40.0-44.9 in adult (Chronic)    Overview     The patient is asked to make an attempt to improve diet and exercise patterns to aid in medical management of this problem.            Other Visit Diagnoses       Hyperglycemia        Relevant Orders    Hemoglobin A1C              Follow up in about 6 months (around 11/3/2023) for blood pressure.      RTC/ED precautions discussed where applicable.   Encouraged patient to let me know if there are any further questions/concerns.     Advise patient/caretaker to check with insurance regarding orders to avoid unexpected fees/costs.     The patient/caretaker indicates understanding of these issues and agrees with the plan.    Dr. Gay Langford MD  Family Medicine

## 2023-05-04 PROBLEM — R73.03 PREDIABETES: Status: ACTIVE | Noted: 2023-05-04

## 2023-05-05 ENCOUNTER — OFFICE VISIT (OUTPATIENT)
Dept: FAMILY MEDICINE | Facility: CLINIC | Age: 73
End: 2023-05-05
Payer: MEDICARE

## 2023-05-05 VITALS
DIASTOLIC BLOOD PRESSURE: 72 MMHG | BODY MASS INDEX: 40.62 KG/M2 | HEIGHT: 65 IN | OXYGEN SATURATION: 98 % | SYSTOLIC BLOOD PRESSURE: 118 MMHG | WEIGHT: 243.81 LBS | HEART RATE: 76 BPM

## 2023-05-05 DIAGNOSIS — M25.561 CHRONIC PAIN OF RIGHT KNEE: ICD-10-CM

## 2023-05-05 DIAGNOSIS — R39.15 URINARY URGENCY: Chronic | ICD-10-CM

## 2023-05-05 DIAGNOSIS — M54.50 ACUTE MIDLINE LOW BACK PAIN WITHOUT SCIATICA: Chronic | ICD-10-CM

## 2023-05-05 DIAGNOSIS — F41.9 ANXIETY: ICD-10-CM

## 2023-05-05 DIAGNOSIS — G89.29 CHRONIC PAIN OF RIGHT KNEE: ICD-10-CM

## 2023-05-05 DIAGNOSIS — I10 PRIMARY HYPERTENSION: Chronic | ICD-10-CM

## 2023-05-05 DIAGNOSIS — Z00.00 ENCOUNTER FOR PREVENTIVE HEALTH EXAMINATION: Primary | ICD-10-CM

## 2023-05-05 DIAGNOSIS — E66.01 MORBID OBESITY WITH BODY MASS INDEX OF 40.0-44.9 IN ADULT: Chronic | ICD-10-CM

## 2023-05-05 DIAGNOSIS — E78.2 MIXED HYPERLIPIDEMIA: ICD-10-CM

## 2023-05-05 DIAGNOSIS — F32.0 MAJOR DEPRESSIVE DISORDER, SINGLE EPISODE, MILD: Chronic | ICD-10-CM

## 2023-05-05 DIAGNOSIS — R73.03 PREDIABETES: ICD-10-CM

## 2023-05-05 PROCEDURE — G0439 PPPS, SUBSEQ VISIT: HCPCS | Mod: HCNC,S$GLB,,

## 2023-05-05 PROCEDURE — 3008F PR BODY MASS INDEX (BMI) DOCUMENTED: ICD-10-PCS | Mod: HCNC,CPTII,S$GLB,

## 2023-05-05 PROCEDURE — 1159F MED LIST DOCD IN RCRD: CPT | Mod: HCNC,CPTII,S$GLB,

## 2023-05-05 PROCEDURE — 1160F PR REVIEW ALL MEDS BY PRESCRIBER/CLIN PHARMACIST DOCUMENTED: ICD-10-PCS | Mod: HCNC,CPTII,S$GLB,

## 2023-05-05 PROCEDURE — 1170F PR FUNCTIONAL STATUS ASSESSED: ICD-10-PCS | Mod: HCNC,CPTII,S$GLB,

## 2023-05-05 PROCEDURE — G0439 PR MEDICARE ANNUAL WELLNESS SUBSEQUENT VISIT: ICD-10-PCS | Mod: HCNC,S$GLB,,

## 2023-05-05 PROCEDURE — 99999 PR PBB SHADOW E&M-EST. PATIENT-LVL IV: ICD-10-PCS | Mod: PBBFAC,HCNC,,

## 2023-05-05 PROCEDURE — 3044F HG A1C LEVEL LT 7.0%: CPT | Mod: HCNC,CPTII,S$GLB,

## 2023-05-05 PROCEDURE — 3074F PR MOST RECENT SYSTOLIC BLOOD PRESSURE < 130 MM HG: ICD-10-PCS | Mod: HCNC,CPTII,S$GLB,

## 2023-05-05 PROCEDURE — 1160F RVW MEDS BY RX/DR IN RCRD: CPT | Mod: HCNC,CPTII,S$GLB,

## 2023-05-05 PROCEDURE — 1101F PT FALLS ASSESS-DOCD LE1/YR: CPT | Mod: HCNC,CPTII,S$GLB,

## 2023-05-05 PROCEDURE — 1101F PR PT FALLS ASSESS DOC 0-1 FALLS W/OUT INJ PAST YR: ICD-10-PCS | Mod: HCNC,CPTII,S$GLB,

## 2023-05-05 PROCEDURE — 3288F FALL RISK ASSESSMENT DOCD: CPT | Mod: HCNC,CPTII,S$GLB,

## 2023-05-05 PROCEDURE — 99999 PR PBB SHADOW E&M-EST. PATIENT-LVL IV: CPT | Mod: PBBFAC,HCNC,,

## 2023-05-05 PROCEDURE — 3078F DIAST BP <80 MM HG: CPT | Mod: HCNC,CPTII,S$GLB,

## 2023-05-05 PROCEDURE — 1126F PR PAIN SEVERITY QUANTIFIED, NO PAIN PRESENT: ICD-10-PCS | Mod: HCNC,CPTII,S$GLB,

## 2023-05-05 PROCEDURE — 3288F PR FALLS RISK ASSESSMENT DOCUMENTED: ICD-10-PCS | Mod: HCNC,CPTII,S$GLB,

## 2023-05-05 PROCEDURE — 3074F SYST BP LT 130 MM HG: CPT | Mod: HCNC,CPTII,S$GLB,

## 2023-05-05 PROCEDURE — 3078F PR MOST RECENT DIASTOLIC BLOOD PRESSURE < 80 MM HG: ICD-10-PCS | Mod: HCNC,CPTII,S$GLB,

## 2023-05-05 PROCEDURE — 3044F PR MOST RECENT HEMOGLOBIN A1C LEVEL <7.0%: ICD-10-PCS | Mod: HCNC,CPTII,S$GLB,

## 2023-05-05 PROCEDURE — 1159F PR MEDICATION LIST DOCUMENTED IN MEDICAL RECORD: ICD-10-PCS | Mod: HCNC,CPTII,S$GLB,

## 2023-05-05 PROCEDURE — 3008F BODY MASS INDEX DOCD: CPT | Mod: HCNC,CPTII,S$GLB,

## 2023-05-05 PROCEDURE — 1126F AMNT PAIN NOTED NONE PRSNT: CPT | Mod: HCNC,CPTII,S$GLB,

## 2023-05-05 PROCEDURE — 1170F FXNL STATUS ASSESSED: CPT | Mod: HCNC,CPTII,S$GLB,

## 2023-05-05 NOTE — PROGRESS NOTES
"    Ludmila Jimenez presented for a  Medicare AWV and comprehensive Health Risk Assessment today. The following components were reviewed and updated:    Medical history  Family History  Social history  Allergies and Current Medications  Health Risk Assessment  Health Maintenance  Care Team         ** See Completed Assessments for Annual Wellness Visit within the encounter summary.**         The following assessments were completed:  Living Situation  CAGE  Depression Screening  Timed Get Up and Go  Whisper Test  Cognitive Function Screening      Nutrition Screening  ADL Screening  PAQ Screening        Vitals:    05/05/23 1310   BP: 118/72   BP Location: Left arm   Patient Position: Sitting   BP Method: Large (Manual)   Pulse: 76   SpO2: 98%   Weight: 110.6 kg (243 lb 12.8 oz)   Height: 5' 5" (1.651 m)     Body mass index is 40.57 kg/m².  Physical Exam  Vitals reviewed.   Constitutional:       Appearance: Normal appearance. She is well-developed and well-groomed.   Cardiovascular:      Rate and Rhythm: Normal rate and regular rhythm.   Pulmonary:      Effort: Pulmonary effort is normal. No respiratory distress.      Breath sounds: No wheezing, rhonchi or rales.   Skin:     Coloration: Skin is not pale.   Neurological:      General: No focal deficit present.      Mental Status: She is alert and oriented to person, place, and time.   Psychiatric:         Attention and Perception: Attention normal.         Mood and Affect: Mood normal.         Speech: Speech normal.         Behavior: Behavior normal. Behavior is cooperative.         Thought Content: Thought content normal.         Judgment: Judgment normal.             Diagnoses and health risks identified today and associated recommendations/orders:    1. Encounter for preventive health examination  2. Morbid obesity with body mass index of 40.0-44.9 in adult  Encouraged diet modification, continue to exercise as tolerated. Followed by PCP.     3. Major depressive " disorder, single episode, mild  4. Anxiety  Chronic. Stable. Followed by PCP.     5. Primary hypertension  6. Mixed hyperlipidemia  Chronic; stable on medication. Followed by PCP.    7. Urinary urgency  Chronic. Stable. Followed by PCP.     8. Prediabetes  Discussed in detail. Encouraged diet modification and exercise as tolerated. Followed by PCP.     9. Acute midline low back pain without sciatica  10. Chronic pain of right knee  Chronic; stable on medication. Followed by PCP.       Review for Opioid Screening: Patient does not have rx for Opioids.    Review for Substance Use Disorders: Patient does not use substance.    Provided Ludmila with a 5-10 year written screening schedule and personal prevention plan. Recommendations were developed using the USPSTF age appropriate recommendations. Education, counseling, and referrals were provided as needed. After Visit Summary printed and given to patient which includes a list of additional screenings\tests needed.    Follow up in about 1 year (around 5/5/2024) for your next annual wellness visit.    Ana Hart NP      Advance Care Planning     I offered to discuss advanced care planning, including how to pick a person who would make decisions for you if you were unable to make them for yourself, called a health care power of , and what kind of decisions you might make such as use of life sustaining treatments such as ventilators and tube feeding when faced with a life limiting illness recorded on a living will that they will need to know. (How you want to be cared for as you near the end of your natural life)     X Patient is interested in learning more about how to make advanced directives.  I provided them paperwork and offered to discuss this with them.

## 2023-05-05 NOTE — PATIENT INSTRUCTIONS
Counseling and Referral of Other Preventative  (Italic type indicates deductible and co-insurance are waived)    Patient Name: Ludmila Jimenez  Today's Date: 5/5/2023    Health Maintenance       Date Due Completion Date    COVID-19 Vaccine (4 - Booster for Pfizer series) 07/31/2023 (Originally 1/28/2022) 12/3/2021    Hepatitis C Screening 10/31/2023 (Originally 1950) ---    Mammogram 12/06/2023 12/6/2022    Hemoglobin A1c (Prediabetes) 05/03/2024 5/3/2023    DEXA Scan 10/28/2024 10/28/2021    Lipid Panel 05/03/2028 5/3/2023    Colorectal Cancer Screening 02/27/2030 2/27/2020    TETANUS VACCINE 01/03/2033 1/3/2023        No orders of the defined types were placed in this encounter.    The following information is provided to all patients.  This information is to help you find resources for any of the problems found today that may be affecting your health:                Living healthy guide: www.[facility].[place].gov      Understanding Diabetes: www.diabetes.org      Eating healthy: www.cdc.gov/healthyweight      CDC home safety checklist: www.cdc.gov/steadi/patient.html      Agency on Aging: www.goea.[place].gov      Alcoholics anonymous (AA): www.aa.org      Physical Activity: www.bhavin.nih.gov/hh1dmun      Tobacco use: www.quitwithusla.org

## 2023-05-08 NOTE — TELEPHONE ENCOUNTER
No care due was identified.  [facility] Embedded Care Due Messages. Reference number: [id number].   5/08/2023 11:21:17 AM CDT

## 2023-05-09 RX ORDER — LANCETS 33 GAUGE
EACH MISCELLANEOUS
Qty: 100 EACH | Refills: 3 | Status: SHIPPED | OUTPATIENT
Start: 2023-05-09

## 2023-05-09 RX ORDER — CALCIUM CITRATE/VITAMIN D3 200MG-6.25
TABLET ORAL
Qty: 100 STRIP | Refills: 3 | Status: SHIPPED | OUTPATIENT
Start: 2023-05-09

## 2023-05-09 NOTE — TELEPHONE ENCOUNTER
Refill Routing Note   Medication(s) are not appropriate for processing by [facility] for the following reason(s):      No active prescription written by PCP    [facility] action(s):  Defer None identified            Appointments  past 12m or future 3m with PCP    Date Provider   Last Visit   5/3/2023 Gay Langford MD   Next Visit   Visit date not found Gay Langford MD   ED visits in past 90 days: 0        Note composed:4:27 AM 05/09/2023

## 2023-06-13 ENCOUNTER — TELEPHONE (OUTPATIENT)
Dept: ORTHOPEDICS | Facility: CLINIC | Age: 73
End: 2023-06-13
Payer: MEDICARE

## 2023-06-13 NOTE — TELEPHONE ENCOUNTER
----- Message from Jose A Arreola sent at 6/13/2023 11:47 AM CDT -----  Regarding: Appointment  Contact: [phone]  Calling in regards to getting an appt for right ankle pain. Please call pt to schedule as soon as possible      We spoke    she is having pain on the back of her right ankle where it connects to her leg-sounds like the achilles tendon   I booked her at the [place] [facility] as she had asked for with Aureliano FARIAS June 19 @ 3: 30 pm

## 2023-06-15 ENCOUNTER — TELEPHONE (OUTPATIENT)
Dept: SPORTS MEDICINE | Facility: CLINIC | Age: 73
End: 2023-06-15
Payer: MEDICARE

## 2023-06-15 NOTE — TELEPHONE ENCOUNTER
Patient was informed that the provider does not treat ankles. Patient was given a contact number to reach to help her in getting assisted with a provider who treats for ankles. Patient confirmed appointment cancellation for 06/19 at 3:30 PM.

## 2023-06-27 ENCOUNTER — OFFICE VISIT (OUTPATIENT)
Dept: PODIATRY | Facility: CLINIC | Age: 73
End: 2023-06-27
Payer: MEDICARE

## 2023-06-27 VITALS — HEIGHT: 65 IN | BODY MASS INDEX: 38.93 KG/M2 | WEIGHT: 233.69 LBS

## 2023-06-27 DIAGNOSIS — R60.0 LOCALIZED EDEMA: ICD-10-CM

## 2023-06-27 DIAGNOSIS — M79.671 RIGHT FOOT PAIN: Primary | ICD-10-CM

## 2023-06-27 DIAGNOSIS — M21.861 ACQUIRED POSTERIOR EQUINUS OF RIGHT LOWER EXTREMITY: ICD-10-CM

## 2023-06-27 DIAGNOSIS — M76.61 TENDONITIS, ACHILLES, RIGHT: Primary | ICD-10-CM

## 2023-06-27 PROCEDURE — 1160F PR REVIEW ALL MEDS BY PRESCRIBER/CLIN PHARMACIST DOCUMENTED: ICD-10-PCS | Mod: HCNC,CPTII,S$GLB, | Performed by: PODIATRIST

## 2023-06-27 PROCEDURE — 1101F PR PT FALLS ASSESS DOC 0-1 FALLS W/OUT INJ PAST YR: ICD-10-PCS | Mod: HCNC,CPTII,S$GLB, | Performed by: PODIATRIST

## 2023-06-27 PROCEDURE — 99203 PR OFFICE/OUTPT VISIT, NEW, LEVL III, 30-44 MIN: ICD-10-PCS | Mod: 25,HCNC,S$GLB, | Performed by: PODIATRIST

## 2023-06-27 PROCEDURE — 3288F FALL RISK ASSESSMENT DOCD: CPT | Mod: HCNC,CPTII,S$GLB, | Performed by: PODIATRIST

## 2023-06-27 PROCEDURE — 3008F BODY MASS INDEX DOCD: CPT | Mod: HCNC,CPTII,S$GLB, | Performed by: PODIATRIST

## 2023-06-27 PROCEDURE — 1159F PR MEDICATION LIST DOCUMENTED IN MEDICAL RECORD: ICD-10-PCS | Mod: HCNC,CPTII,S$GLB, | Performed by: PODIATRIST

## 2023-06-27 PROCEDURE — 1101F PT FALLS ASSESS-DOCD LE1/YR: CPT | Mod: HCNC,CPTII,S$GLB, | Performed by: PODIATRIST

## 2023-06-27 PROCEDURE — 1159F MED LIST DOCD IN RCRD: CPT | Mod: HCNC,CPTII,S$GLB, | Performed by: PODIATRIST

## 2023-06-27 PROCEDURE — 1126F PR PAIN SEVERITY QUANTIFIED, NO PAIN PRESENT: ICD-10-PCS | Mod: HCNC,CPTII,S$GLB, | Performed by: PODIATRIST

## 2023-06-27 PROCEDURE — 3288F PR FALLS RISK ASSESSMENT DOCUMENTED: ICD-10-PCS | Mod: HCNC,CPTII,S$GLB, | Performed by: PODIATRIST

## 2023-06-27 PROCEDURE — 99203 OFFICE O/P NEW LOW 30 MIN: CPT | Mod: 25,HCNC,S$GLB, | Performed by: PODIATRIST

## 2023-06-27 PROCEDURE — 99999 PR PBB SHADOW E&M-EST. PATIENT-LVL III: CPT | Mod: PBBFAC,HCNC,, | Performed by: PODIATRIST

## 2023-06-27 PROCEDURE — 3008F PR BODY MASS INDEX (BMI) DOCUMENTED: ICD-10-PCS | Mod: HCNC,CPTII,S$GLB, | Performed by: PODIATRIST

## 2023-06-27 PROCEDURE — 3044F PR MOST RECENT HEMOGLOBIN A1C LEVEL <7.0%: ICD-10-PCS | Mod: HCNC,CPTII,S$GLB, | Performed by: PODIATRIST

## 2023-06-27 PROCEDURE — 29580 STRAPPING UNNA BOOT: CPT | Mod: HCNC,RT,S$GLB, | Performed by: PODIATRIST

## 2023-06-27 PROCEDURE — 29580 PR STRAPPING UNNA BOOT: ICD-10-PCS | Mod: HCNC,RT,S$GLB, | Performed by: PODIATRIST

## 2023-06-27 PROCEDURE — 1126F AMNT PAIN NOTED NONE PRSNT: CPT | Mod: HCNC,CPTII,S$GLB, | Performed by: PODIATRIST

## 2023-06-27 PROCEDURE — 99999 PR PBB SHADOW E&M-EST. PATIENT-LVL III: ICD-10-PCS | Mod: PBBFAC,HCNC,, | Performed by: PODIATRIST

## 2023-06-27 PROCEDURE — 3044F HG A1C LEVEL LT 7.0%: CPT | Mod: HCNC,CPTII,S$GLB, | Performed by: PODIATRIST

## 2023-06-27 PROCEDURE — 1160F RVW MEDS BY RX/DR IN RCRD: CPT | Mod: HCNC,CPTII,S$GLB, | Performed by: PODIATRIST

## 2023-06-27 RX ORDER — METHYLPREDNISOLONE 4 MG/1
TABLET ORAL
Qty: 21 EACH | Refills: 0 | Status: SHIPPED | OUTPATIENT
Start: 2023-06-27 | End: 2023-11-08

## 2023-06-27 NOTE — PROGRESS NOTES
[facility] PODIATRY  [address] [place] [address], [address]  [facility] [id number]  Dept: [phone]  Dept Fax: [phone]    Juarez Murray Jr., BECK     Assessment:   MDM    Coding  1. Tendonitis, Achilles, right  methylPREDNISolone (MEDROL DOSEPACK) 4 mg tablet      2. Acquired posterior equinus of right lower extremity        3. Localized edema            Plan:     Procedures  1. Tendonitis, Achilles, right  -     methylPREDNISolone (MEDROL DOSEPACK) 4 mg tablet; use as directed  Dispense: 21 each; Refill: 0    2. Acquired posterior equinus of right lower extremity    3. Localized edema      Ludmila was seen today for heel pain.    Diagnoses and all orders for this visit:    Tendonitis, Achilles, right  -     methylPREDNISolone (MEDROL DOSEPACK) 4 mg tablet; use as directed    Acquired posterior equinus of right lower extremity    Localized edema        -pt seen, evaluated, and managed  -dx discussed in detail. All questions/concerns addressed  -all tx options discussed. All alternatives, risks, benefits of all txs discussed  -the patient was educated about the diagnosis  -We discussed conservative care options possible including but not limited to shoe wear and/or padding, bracing/strapping, at home ROM, formal PT, medical therapy, injection therapy  - The utilization of NSAIDs can be considered but their benefit has to be tempered against the risk of GI/ concerns  -xr/imaging on way out--> will review at nxt visit  -labs reviewed by me: ok for medrol dose pack  -implemented icing/stretching regimen  -heel lifts dispensed  -unna boot applied to the R LE for edema     -rxs dispensed: medrol dose pack  -referrals: none  -WB: wbat      Follow up in about 4 weeks (around 7/25/2023).    Subjective:      Patient ID: Ludmila Jimenez is a 73 y.o. female.    Chief Complaint:   Chief Complaint   Patient presents with    Heel Pain     Achilles tendon right heel        CC - posterior heel pain: patient  presents to the clinic complaining of heel pain in the right foot. The pain is described as sharp. The onset of the pain was gradual and has worsened over the past several wks. Patient rates the pain as 7/10. Patient denies a history of trauma. Prior treatments include none.      HPI    Last Podiatry Enc: Visit date not found  Last Enc w/ Me: Visit date not found    Outside reports reviewed: historical medical records.  Family hx: as below  Past Medical History:   Diagnosis Date    High cholesterol     Hypertension      Past Surgical History:   Procedure Laterality Date    COLONOSCOPY  02/27/2020    One 4 mm polyp in the ascending colon.Repeat colonoscopy  in 7-10 years.    HYSTERECTOMY       Family History   Problem Relation Age of Onset    Hypertension Sister     No Known Problems Brother     Hypertension Daughter     No Known Problems Daughter     No Known Problems Son     No Known Problems Son     No Known Problems Son     No Known Problems Son      Current Outpatient Medications   Medication Sig Dispense Refill    aspirin (ECOTRIN) 81 MG EC tablet Take 81 mg by mouth once daily.      atorvastatin (LIPITOR) 10 MG tablet Take 1 tablet (10 mg total) by mouth once daily. 90 tablet 3    blood-glucose meter (TRUE METRIX GLUCOSE METER) kit Check glucose as directed 1 each 0    hydroCHLOROthiazide (HYDRODIURIL) 25 MG tablet Take 1 tablet (25 mg total) by mouth once daily. 90 tablet 3    metoprolol succinate (TOPROL-XL) 50 MG 24 hr tablet Take 1 tablet (50 mg total) by mouth once daily. 90 tablet 3    multivit-min/iron/folic/lutein (CENTRUM SILVER WOMEN ORAL) Take 1 tablet by mouth Daily.      naproxen (NAPROSYN) 500 MG tablet TAKE 1 TABLET 2 TIMES DAILY WITH MEALS FOR 10 DAYS 20 tablet 0    TRUE METRIX GLUCOSE TEST STRIP Strp TEST BLOOD SUGAR EVERY  strip 3    TRUEPLUS LANCETS 33 gauge Misc TEST BLOOD SUGAR EVERY  each 3    azelastine (ASTELIN) 137 mcg (0.1 %) nasal spray 1 spray (137 mcg total) by Nasal  route 2 (two) times daily. 30 mL 0    methylPREDNISolone (MEDROL DOSEPACK) 4 mg tablet use as directed 21 each 0     No current facility-administered medications for this visit.     Review of patient's allergies indicates:  No Known Allergies  Social History     Socioeconomic History    Marital status:    Tobacco Use    Smoking status: Never     Passive exposure: Never    Smokeless tobacco: Never   Substance and Sexual Activity    Alcohol use: Not Currently     Alcohol/week: 1.0 standard drink     Types: 1 Glasses of wine per week     Comment: very seldom     Drug use: No    Sexual activity: Not Currently     Social Determinants of Health     Financial Resource Strain: Low Risk     Difficulty of Paying Living Expenses: Not hard at all   Food Insecurity: No Food Insecurity    Worried About Running Out of Food in the Last Year: Never true    Ran Out of Food in the Last Year: Never true   Transportation Needs: No Transportation Needs    Lack of Transportation (Medical): No    Lack of Transportation (Non-Medical): No   Physical Activity: Sufficiently Active    Days of Exercise per Week: 7 days    Minutes of Exercise per Session: 60 min   Stress: No Stress Concern Present    Feeling of Stress : Not at all   Social Connections: Moderately Isolated    Frequency of Communication with Friends and Family: More than three times a week    Frequency of Social Gatherings with Friends and Family: More than three times a week    Attends Faith Services: More than 4 times per year    Active Member of Clubs or Organizations: No    Attends Club or Organization Meetings: Never    Marital Status:    Housing Stability: Low Risk     Unable to Pay for Housing in the Last Year: No    Number of Places Lived in the Last Year: 1    Unstable Housing in the Last Year: No       ROS    REVIEW OF SYSTEMS: Negative as documented below as well as positive findings in bold.       Constitutional  Respiratory  Gastrointestinal  Skin   -  "Fever - Cough - Heartburn - Rash   - Chills - Spit blood - Nausea - Itching   - Weight Loss - Shortness of breath - Vomiting - Nail pain   - Malaise/Fatigue - Wheezing - Abdominal Pain  Wound/Ulcer   - Weight Gain   - Blood in Stool  Poor wound healing       - Diarrhea          Cardiovascular  Genitourinary  Neurological  HEENT   - Chest Pain - Dysuria - Burning Sensation of feet - Headache   - Palpitations - Hematuria - Tingling / Paresthesia - Congestion   - Pain at night in legs - Flank Pain - Dizziness - Sore Throat   - Cramping   - Tremor - Blurred Vision   - Leg Swelling   - Sensory Change - Double Vision   - Dizzy when standing   - Speech Change - Eye Redness       - Focal Weakness - Dry Eyes       - Loss of Consciousness          Endocrine  Musculoskeletal  Psychiatric   - Cold intolerance - Muscle Pain - Depression   - Heat intolerance - Neck Pain - Insomnia   - Anemia - Joint Pain - Memory Loss   -  Easy bruising, bleeding - Heel pain - Anxiety      Toe Pain        Leg/Ankle/Foot Pain         Objective:     Ht 5' 5" (1.651 m)   Wt 106 kg (233 lb 11 oz)   LMP  (LMP Unknown)   BMI 38.89 kg/m²   Vitals:    06/27/23 1102   Weight: 106 kg (233 lb 11 oz)   Height: 5' 5" (1.651 m)   PainSc: 0-No pain   PainLoc: Foot       Physical Exam    General Appearance:   Patient appears well developed, well nourished  Patient appears stated age    Psychiatric:   Patient is oriented to time, place, and person.  Patient has appropriate mood and affect    Neck:  Trachea Midline  No visible masses    Respiratory/Ears:  No distress or labored breathing.  Able to differentiate between normal talking voice and whisper.  Able to follow commands    Eyes:  Visual Acuity intact  Lids and conjunctivae normal. No discoloration noted.    Foot Exam  Physical Exam  Ortho Exam  Ortho/SPM Exam  Physical Exam  Foot/Ankle Musculoskeletal Exam    R LE exam con't:  V:  DP 2/4, PT 2/4   CRT< 3s to all digits tested   Anterior tibial and " popliteal lymph nodes are w/o abnormality    edema present, varicosities absent    N: Patient displays normal ankle reflexes   SILT in SP/DP/T/Nani/Saph distributions    Ortho: +Motor EHL/FHL/TA/GA   +TTP R posterior calcaneus in area of achilles insertion   Enlarged bony prominence is  present   Compartments soft/compressible. No pain on passive stretch of big toe. No calf  Pain.   equinus deformity present    Derm: skin intact, skin warm and dry, skin without ulcers or lesions, skin without induration, nails normal    Imaging / Labs:      No results found.      Note: This was dictated using a computer transcription program. Although proofread, it may contain computer transcription errors and phonetic errors. Other human proofreading errors may also exist. Corrections may be performed at a later time. Please contact us for any clarification if needed.    Juarez Murray DPM  [facility]

## 2023-06-27 NOTE — PATIENT INSTRUCTIONS
"Achilles Tendon Disorders        What Is the Achilles Tendon?    The Achilles tendon is a band of tissue that connects a muscle to a bone. It runs down the back of the lower leg and connects the calf muscle to the heel bone. Also called the heel cord, the Achilles tendon facilitates walking by helping to raise the heel off the ground.      Achilles Tendonitis and Achilles Tendonosis  Two common disorders that occur in the heel cord are Achilles tendonitis and Achilles tendonosis.    Achilles tendonitis is an inflammation of the Achilles tendon. This inflammation is typically short-lived. Over time, if not resolved, the condition may progress to a degeneration of the tendon (Achilles tendonosis), in which the tendon loses its organized structure and is likely to develop microscopic tears. Sometimes the degeneration involves the site where the Achilles tendon attaches to the heel bone. In rare cases, chronic degeneration with or without pain may result in rupture of the tendon.    Causes  As "overuse" disorders, Achilles tendonitis and tendonosis are usually caused by a sudden increase of a repetitive activity involving the Achilles tendon. Such activity puts too much stress on the tendon too quickly, leading to micro-injury of the tendon fibers. Due to this ongoing stress on the tendon, the body is unable to repair the injured tissue. The structure of the tendon is then altered, resulting in continued pain.        Athletes are at high risk for developing disorders of the Achilles tendon. Achilles tendonitis and tendonosis are also common in individuals whose work puts stress on their ankles and feet, such as laborers, as well as in weekend warriors--those who are less conditioned and participate in athletics only on weekends or infrequently.    In addition, people with excessive pronation (flattening of the arch) have a tendency to develop Achilles tendonitis and tendonosis due to the greater demands placed on the " tendon when walking. If these individuals wear shoes without adequate stability, their overpronation could further aggravate the Achilles tendon.    Symptoms  The symptoms associated with Achilles tendonitis and tendonosis include:    Pain--aching, stiffness, soreness or tenderness--within the tendon. This may occur anywhere along the tendons path, beginning with the tendons attachment directly above the heel upward to the region just below the calf muscle. Pain often appears upon arising in the morning or after periods of rest, then improves somewhat with motion but later worsens with increased activity.  Tenderness, or sometimes intense pain, when the sides of the tendon are squeezed. There is less tenderness, however, when pressing directly on the back of the tendon.  When the disorder progresses to degeneration, the tendon may become enlarged and may develop nodules in the area where the tissue is damaged.     Diagnosis  In diagnosing Achilles tendonitis or tendonosis, the surgeon will examine the patients foot and ankle and evaluate the range of motion and condition of the tendon. The extent of the condition can be further assessed with x-rays or other imaging modalities.    Treatment  Treatment approaches for Achilles tendonitis or tendonosis are selected on the basis of how long the injury has been present and the degree of damage to the tendon. In the early stage, when there is sudden (acute) inflammation, one or more of the following options may be recommended:    Immobilization. Immobilization may involve the use of a cast or removable walking boot to reduce forces through the Achilles tendon and promote healing.  Ice. To reduce swelling due to inflammation, apply a bag of ice over a thin towel to the affected area for 20 minutes of each waking hour. Do not put ice directly against the skin.  Oral medications. Nonsteroidal anti-inflammatory drugs (NSAIDs), such as ibuprofen, may be helpful in reducing  the pain and inflammation in the early stage of the condition.  Orthotics. For those with overpronation or gait abnormalities, custom orthotic devices may be prescribed.  Night splints. Night splints help to maintain a stretch in the Achilles tendon during sleep.  Physical therapy. Physical therapy may include strengthening exercises, soft-tissue massage/mobilization, gait and running re-education, stretching and ultrasound therapy.     When Is Surgery Needed?  If nonsurgical approaches fail to restore the tendon to its normal condition, surgery may be necessary. The foot and ankle surgeon will select the best procedure to repair the tendon, based on the extent of the injury, the patients age and activity level, and other factors.    Prevention  To prevent Achilles tendonitis or tendonosis from recurring after surgical or nonsurgical treatment, the foot and ankle surgeon may recommend strengthening and stretching of the calf muscles through daily exercises. Wearing proper shoes for the foot type and activity is also important in preventing recurrence of the condition.      Understanding Achilles Tendonitis    Achilles tendonitis is an overuse injury. It results in inflammation of the Achilles tendon. This tendon is found on the back of the ankle. It links the calf muscle to the heel bone. It helps you do pushing-off movements like running or standing on your toes.     How to say it  [id number]-[id number]-dun-I-tis   What causes Achilles tendonitis?  Achilles tendonitis can happen if you do an activity like running, walking, or jumping too much. This overuse can strain, or pull, the tendon. It may lead to minor tearing of the tendon. An injury to the lower leg or foot can also cause it.  If you dont warm up before taking part in sports such as basketball, you are more likely to suffer from this condition. You are also more prone to it if you do too much of such an activity too quickly. Proper training and rest can help  prevent it.  Symptoms of Achilles tendonitis  The main symptom of Achilles tendonitis is pain. This pain mostly happens when you move the ankle. The tendon may also feel stiff after a period of no activity, such as sleeping. It may also become swollen. You may hear a crackling sound when you move your ankle.  Treatment for Achilles tendonitis  Symptoms often get better after starting treatment. A full recovery may take several months. Treatments include:  Rest. You should stop or change the activity that caused the injury. The tendon will then have time to heal.  Cold or heat pack. These help reduce pain and swelling.  Prescription or over-the-counter pain medicines. These help reduce pain and swelling.  Shoe inserts. These devices can reduce strain on the Achilles tendon when you move. You may then feel less pain.  Stretching and strengthening exercises. Certain exercises can help you regain flexibility and strength in your Achilles tendon.  Surgery. This option can fix the injured tendon. But you dont often need it unless other treatments dont work.     When to call your healthcare provider   Call your healthcare provider right away if you have any of these:  Fever of 100.4°F (38°C) or higher, or as directed  Pain that gets worse  Symptoms that dont get better, or get worse  New symptoms    Date Last Reviewed: 3/10/2016  © 4970-7013 [employer]. [address] [place] [address], [place] [address]. All rights reserved. This information is not intended as a substitute for professional medical care. Always follow your healthcare professional's instructions.        Understanding Retrocalcaneal Bursitis    Retrocalcaneal bursitis is a condition that causes heel pain. This pain spreads from the bursa located between the Achilles tendon and the heel bone. This bursa normally provides a cushion as you walk.  A bursa is a fluid-filled sac. Your body has many of them. They are found in areas where rubbing may occur,  such as between tendons and bones. The fluid inside them helps ease friction during movement. If they become injured or irritated, you may feel pain.     How to say it  [id number]-[id number]-GUS-carey bur-SI-nuha   What causes retrocalcaneal bursitis?  This type of bursitis is mainly caused by using the ankle a lot, such as running uphill. A sudden increase in running or similar activities can also lead to it. Athletes who wear tight-fitting shoes while practicing or exercising are more prone to the condition. So too are those who dont stretch or warm up properly before such activities. Some cases may result from an infection or a disease such as arthritis.  Symptoms of retrocalcaneal bursitis  Severe pain and swelling in the heel are typical symptoms. You may also notice tenderness when the heel is touched. Tight-fitting shoes may become hard to wear. You may hear a crackling sound when you flex your foot.  Treatment for retrocalcaneal bursitis  The aim of treatment is to ease symptoms so that the bursa has time to heal. Treatment may include:  Rest. You may need to alter or limit activities that cause heel pain. These include high-impact activities like running.  Prescription or over-the-counter medicines. These help reduce pain and swelling.  Cold packs or heat packs. Thesemay ease pain.  Shoe inserts or padding. Devices such as heel cups or pads for the back of your heel can ease discomfort when moving.  Footwear. You should avoid wearing tight-fitting shoes or those that rub the back of your heel. Shoes with an open-back heel, such as clogs, may help.  Stretching exercises. Gentle stretching movements can restore flexibility in your ankle and foot. They can also help with pain.  Steroid injection. A needle is used to inject a pain reliever and steroid into the affected bursa. This shot can relieve pain and reduce swelling for several weeks.  Surgery. This treatment is rarely needed unless other options dont  work.  Complications of retrocalcaneal bursitis  Limited range of motion in the affected foot and ankle  Infected bursa     When to call your healthcare provider  Call your healthcare provider right away if you have any of these:  Fever of 100.4°F (38°C) or higher, or as directed  Pain that gets worse  Symptoms that dont get better, or get worse  New symptoms    Date Last Reviewed: 3/10/2016  © 1190-3701 [employer]. [address] [place] [address]. All rights reserved. This information is not intended as a substitute for professional medical care. Always follow your healthcare professional's instructions.          Treating Tendonitis of the Foot  Your healthcare provider's first concern is to reduce your symptoms. Using ice and heat, taking medicines, and limiting activity help control pain and swelling. Follow all of your healthcare provider's instructions. Returning to activity too soon may cause your symptoms to come back.    Ice and heat  Ice helps prevent swelling and reduce pain. Place ice on the painful area for 10 to 15 minutes. Repeat the icing several times a day. If ?you have had the problem for a while, using heat may help. Apply a heating pad or hot towels to the tendon for 20 to 30 minutes 2 or 3 times a day.  Medicines  Your healthcare provider may tell you to take ibuprofen or other anti-inflammatory medicines. These reduce pain and swelling. Take them as directed. Dont wait until you feel pain. In more severe cases, cortisone may be injected to relieve pain.  Limiting activities  Rest allows the tissues in your foot to heal. Stay off your feet for a few days, then slowly work back into activity. If you do high-impact activities, such as running or aerobics, try other activities that place less strain on your foot. Cycling and swimming are good choices.  Date Last Reviewed: 9/21/2015  © 2074-4314 [employer]. [address] [place] [id number]. All  "rights reserved. This information is not intended as a substitute for professional medical care. Always follow your healthcare professional's instructions.        Eula's Deformity      What Is Eula's Deformity?   Eula's deformity is a bony enlargement on the back of the heel. The soft tissue near the Achilles tendon becomes irritated when the bony enlargement rubs against shoes. This often leads to painful bursitis, which is an inflammation of the bursa (a fluid-filled sac between the tendon and bone).  Causes        Eula's deformity is often called "pump bump" because the rigid backs of pump-style shoes can create pressure that aggravates the enlargement when walking. In fact, any shoes with a rigid back, such as ice skates, men's dress shoes or women's pumps, can cause this irritation.  To some extent, heredity plays a role in Eula's deformity. Inherited foot structures that can make one prone to developing this condition include:  A high-arched foot   A tight Achilles tendon   A tendency to walk on the outside of the heel.  Symptoms  Eula's deformity can occur in one or both feet. The symptoms include:  A noticeable bump on the back of the heel   Pain in the area where the Achilles tendon attaches to the heel   Swelling in the back of the heel   Redness near the inflamed tissue  Diagnosis  After evaluating the patient's symptoms, the foot and ankle surgeon will examine the foot. In addition, x-rays will be ordered to help the surgeon evaluate the structure of the heel bone.  Nonsurgical Treatment  Nonsurgical treatment of Eula's deformity is aimed at reducing the inflammation of the bursa. While these approaches can resolve the pain and inflammation, they will not shrink the bony protrusion. Nonsurgical treatment can include one or more of the following:  Medication. Oral nonsteroidal anti-inflammatory drugs (NSAIDs), such as ibuprofen, may be recommended to reduce the pain and inflammation. " Ice. To reduce swelling, apply an ice pack to the inflamed area, placing a thin towel between the ice and the skin. Use ice for 20 minutes and then wait at least 40 minutes before icing again.   Exercises. Stretching exercises help relieve tension from the Achilles tendon. These exercises are especially important for the patient who has a tight heel cord.   Heel lifts. Patients with high arches may find that heel lifts placed inside the shoe decrease the pressure on the heel.   Heel pads. Pads placed inside the shoe cushion the heel and may help reduce irritation when walking.   Shoe modification. Backless or soft backed shoes help avoid or minimize irritation.   Physical therapy. Physical therapy modalities, such as ultrasound, can help to reduce inflammation.   Orthotic devices. Custom arch supports control the motion in the foot.   Immobilization. In some cases, casting may be necessary.  When Is Surgery Needed?  If nonsurgical treatment fails to provide adequate pain relief, surgery may be needed. The foot and ankle surgeon will determine the procedure that is best suited to your case. It is important to follow the surgeon's instructions for postsurgical care.  Prevention  To help prevent a recurrence of Eula's deformity:  wear appropriate shoes; avoid shoes with a rigid heel back   use arch supports or orthotic devices   perform stretching exercises to prevent the Achilles tendon from tightening   avoid running on hard surfaces and running uphill        Equinus          What Is Equinus?    Equinus is a condition in which the upward bending motion of the ankle joint is limited. Someone with equinus lacks the flexibility to bring the top of the foot toward the front of the leg. Equinus can occur in one or both feet. When it involves both feet, the limitation of motion is sometimes worse in one foot than in the other.    People with equinus develop ways to compensate for their limited ankle motion, and this  often leads to other foot, leg or back problems. The most common methods of compensation are flattening of the arch or picking up the heel early when walking, placing increased pressure on the ball of the foot. Other patients compensate by toe walking, while a smaller number take steps by bending abnormally at the hip or knee.    Causes  There are several possible causes for the limited range of ankle motion. Often, it is due to tightness in the Achilles tendon or calf muscles (the soleus muscle and/or gastrocnemius muscle). In some patients, this tightness is congenital (present at birth), and sometimes it is an inherited trait. Other patients acquire the tightness from being in a cast, being on crutches or frequently wearing high-heeled shoes. In addition, diabetes can affect the fibers of the Achilles tendon and cause tightness. Sometimes equinus is related to a bone blocking the ankle motion. For example, a fragment of a broken bone following an ankle injury, or bone block, can get in the way and restrict motion. Equinus may also result from one leg being shorter than the other. Less often, equinus is caused by spasms in the calf muscle. These spasms may be signs of an underlying neurologic disorder.      Foot Problems Related to Equinus  Depending on how a patient compensates for the inability to bend properly at the ankle, a variety of foot conditions can develop, including:    Plantar fasciitis (arch/heel pain)  Calf cramping  Tendonitis (inflammation in the Achilles tendon)  Metatarsalgia (pain and/or callusing on the ball of the foot)  Flatfoot  Arthritis of the midfoot (middle area of the foot)  Pressure sores on the ball of the foot or the arch  Bunions and hammertoes  Ankle pain  Shin splints     Diagnosis  Most patients with equinus are unaware they have this condition when they first visit the doctor. Instead, they come to the doctor seeking relief for foot problems associated with equinus.    To  diagnose equinus, the foot and ankle surgeon will evaluate the ankle's range of motion when the knee is flexed (bent) as well as extended (straightened). This enables the surgeon to identify whether the tendon or muscle is tight and to assess whether bone is interfering with ankle motion. X-rays may also be ordered. In some cases, the foot and ankle surgeon may refer the patient for neurologic evaluation.    Nonsurgical Treatment  Treatment includes strategies aimed at relieving the symptoms and conditions associated with equinus. In addition, the patient is treated for the equinus itself through one or more of the following options:    Night splint. The foot may be placed in a splint at night to keep it in a position that helps reduce tightness of the calf muscle.  Heel lifts. Placing heel lifts inside the shoes or wearing shoes with a moderate heel takes stress off the Achilles tendon when walking and may reduce symptoms.  Arch supports or orthotic devices. Custom orthotic devices that fit into the shoe are often prescribed to keep weight distributed properly and to help control muscle/tendon imbalance.  Physical therapy. To help remedy muscle tightness, exercises that stretch the calf muscle(s) are recommended.    When Is Surgery Needed?  In some cases, surgery may be needed to correct the cause of equinus if it is related to a tight tendon or a bone blocking the ankle motion. The foot and ankle surgeon will determine the type of procedure that is best suited to the individual patient.    What Is a Gastrocnemius Release?  The gastrocnemius (gastroc) and the soleus are two muscles that make up the calf. The gastroc is the larger and more superficial of the two muscles. The soleus is a deeper muscle within the lower leg. The gastroc tendon combines with the soleus tendon to form the Achilles tendon.  Tightness in the calf can limit how for the ankle can flex up. This may make it difficult to walk with the heel on  the floor. Over time this can cause problems such as pain and deformity. Calf tightness may contribute to many foot problems, including heel pain, Achilles tendon pain, flatfoot deformity, toe pain, and bunions.  A gastrocnemius release lengthens the gastrocnemius tendon. This is done to increase the flexibility of the calf muscle, which can decrease pressure at the front of the foot, improve function, and decrease deformity.    Diagnosis  This surgery is done in patients with tightness of the gastroc that has not improved with stretching exercises. This procedure can be combined with other reconstructive procedures or be performed by itself.  Surgery can be avoided when appropriate range of motion and flexibility can be obtained with conservative treatment (stretching). It should also be avoided if there are contractures of multiple tendons in the leg, and not just the gastroc.     Treatment  The surgery can be performed through several different incisions. Most commonly, a small incision is made on the inner side of the lower leg. Sometimes an incision directly in the back of the calf is used, or even an endoscopic incision, which is about ½ inch. Once the gastroc tendon is identified, it is  from the underlying muscle belly of the soleus, then cut straight across. Once the tendon is released, the ankle is flexed up and an increased range of motion is noted intra-operatively.     Recovery  For the first two weeks after surgery, the patient typically is immobilized in a splint or boot. It is important to keep the ankle in a proper position while the tendon is healing. A cramping feeling in the back of the calf is normal. Gentle range of motion and stretching begin once the ankle is removed from the splint/boot. Timing can vary depending upon what other procedures are done.     Risks and Complications  After a gastroc release, some patients experience nerve injury that results in irritation or numbness over  the outside of the heel. This usually is temporary. In addition, some patients may notice a difference in the appearance of one calf compared to the other and temporary calf weakness.    FAQs  Why are my calf muscles tight?   Most frequently a tight calf muscle is an inherited problem that only causes problems later in life. Other reasons for calf tightness are nerve injuries, muscle problems, and other medical problems like stroke and diabetes. People can also get tight calf muscles after trauma to the leg, ankle, or foot.    Will a gastrocnemius lengthening affect my strength or ability to walk?  This procedure will cause some weakness but most patients will not notice it. Some patients may have a subtle limp, but this typically resolves within six months of surgery.              Ankle Dorsiflexion/Plantarflexion (Flexibility)    Sit on the floor or in bed with your legs straight in front of you.  Point both feet. Then flex both feet.  Do this 10 to 30 times in a row.  Repeat this exercise 2 times a day, or as instructed.  Date Last Reviewed: 5/1/2016  © 4263-0774 [employer]. [address] [place] [address]. All rights reserved. This information is not intended as a substitute for professional medical care. Always follow your healthcare professional's instructions.        Soleus Stretch (Flexibility)    Stand facing a wall from 3 feet away. Take one step toward the wall with your right foot.  Place both palms on the wall. Bend both knees and lean forward. Keep both heels on the floor.  Hold for 30 to 60 seconds. Then relax both legs. Repeat the exercise 2 times.  Switch legs and repeat.  Repeat this exercise 3 times a day, or as instructed.     Tip: Dont bounce while youre stretching.   Date Last Reviewed: 3/10/2016  © 0984-4505 [employer]. [address] [place] [id number]. All rights reserved. This information is not intended as a substitute for professional  medical care. Always follow your healthcare professional's instructions.

## 2023-07-03 ENCOUNTER — TELEPHONE (OUTPATIENT)
Dept: PODIATRY | Facility: CLINIC | Age: 73
End: 2023-07-03
Payer: MEDICARE

## 2023-07-03 NOTE — TELEPHONE ENCOUNTER
Spoke with Ms Jimenez to inform her that per Dr Murray her xray did not show any acute abnormalities. No other needs voiced at this time. Encouraged call back if needed.

## 2023-07-03 NOTE — TELEPHONE ENCOUNTER
----- Message from Juarez Murray Jr., DPM sent at 6/30/2023  8:01 AM CDT -----  Reviewed result. Please call patient to inform them result has no acute abnormalities.

## 2023-07-17 ENCOUNTER — OFFICE VISIT (OUTPATIENT)
Dept: OPTOMETRY | Facility: CLINIC | Age: 73
End: 2023-07-17
Payer: MEDICARE

## 2023-07-17 DIAGNOSIS — H35.033 HYPERTENSIVE RETINOPATHY OF BOTH EYES: Primary | ICD-10-CM

## 2023-07-17 DIAGNOSIS — H52.03 HYPEROPIA OF BOTH EYES WITH ASTIGMATISM AND PRESBYOPIA: ICD-10-CM

## 2023-07-17 DIAGNOSIS — H35.89 RETINAL EXUDATES: ICD-10-CM

## 2023-07-17 DIAGNOSIS — H25.13 NUCLEAR SCLEROTIC CATARACT, BILATERAL: ICD-10-CM

## 2023-07-17 DIAGNOSIS — H52.4 HYPEROPIA OF BOTH EYES WITH ASTIGMATISM AND PRESBYOPIA: ICD-10-CM

## 2023-07-17 DIAGNOSIS — H52.203 HYPEROPIA OF BOTH EYES WITH ASTIGMATISM AND PRESBYOPIA: ICD-10-CM

## 2023-07-17 PROCEDURE — 1159F MED LIST DOCD IN RCRD: CPT | Mod: HCNC,CPTII,S$GLB, | Performed by: OPTOMETRIST

## 2023-07-17 PROCEDURE — 1159F PR MEDICATION LIST DOCUMENTED IN MEDICAL RECORD: ICD-10-PCS | Mod: HCNC,CPTII,S$GLB, | Performed by: OPTOMETRIST

## 2023-07-17 PROCEDURE — 92015 PR REFRACTION: ICD-10-PCS | Mod: HCNC,S$GLB,, | Performed by: OPTOMETRIST

## 2023-07-17 PROCEDURE — 3288F PR FALLS RISK ASSESSMENT DOCUMENTED: ICD-10-PCS | Mod: HCNC,CPTII,S$GLB, | Performed by: OPTOMETRIST

## 2023-07-17 PROCEDURE — 99999 PR PBB SHADOW E&M-EST. PATIENT-LVL III: CPT | Mod: PBBFAC,HCNC,, | Performed by: OPTOMETRIST

## 2023-07-17 PROCEDURE — 3288F FALL RISK ASSESSMENT DOCD: CPT | Mod: HCNC,CPTII,S$GLB, | Performed by: OPTOMETRIST

## 2023-07-17 PROCEDURE — 1101F PR PT FALLS ASSESS DOC 0-1 FALLS W/OUT INJ PAST YR: ICD-10-PCS | Mod: HCNC,CPTII,S$GLB, | Performed by: OPTOMETRIST

## 2023-07-17 PROCEDURE — 1126F PR PAIN SEVERITY QUANTIFIED, NO PAIN PRESENT: ICD-10-PCS | Mod: HCNC,CPTII,S$GLB, | Performed by: OPTOMETRIST

## 2023-07-17 PROCEDURE — 99214 PR OFFICE/OUTPT VISIT, EST, LEVL IV, 30-39 MIN: ICD-10-PCS | Mod: HCNC,S$GLB,, | Performed by: OPTOMETRIST

## 2023-07-17 PROCEDURE — 3044F PR MOST RECENT HEMOGLOBIN A1C LEVEL <7.0%: ICD-10-PCS | Mod: HCNC,CPTII,S$GLB, | Performed by: OPTOMETRIST

## 2023-07-17 PROCEDURE — 92015 DETERMINE REFRACTIVE STATE: CPT | Mod: HCNC,S$GLB,, | Performed by: OPTOMETRIST

## 2023-07-17 PROCEDURE — 99999 PR PBB SHADOW E&M-EST. PATIENT-LVL III: ICD-10-PCS | Mod: PBBFAC,HCNC,, | Performed by: OPTOMETRIST

## 2023-07-17 PROCEDURE — 3044F HG A1C LEVEL LT 7.0%: CPT | Mod: HCNC,CPTII,S$GLB, | Performed by: OPTOMETRIST

## 2023-07-17 PROCEDURE — 99214 OFFICE O/P EST MOD 30 MIN: CPT | Mod: HCNC,S$GLB,, | Performed by: OPTOMETRIST

## 2023-07-17 PROCEDURE — 1101F PT FALLS ASSESS-DOCD LE1/YR: CPT | Mod: HCNC,CPTII,S$GLB, | Performed by: OPTOMETRIST

## 2023-07-17 PROCEDURE — 1126F AMNT PAIN NOTED NONE PRSNT: CPT | Mod: HCNC,CPTII,S$GLB, | Performed by: OPTOMETRIST

## 2023-07-17 RX ORDER — PNEUMOCOCCAL 20-VALENT CONJUGATE VACCINE 2.2; 2.2; 2.2; 2.2; 2.2; 2.2; 2.2; 2.2; 2.2; 2.2; 2.2; 2.2; 2.2; 2.2; 2.2; 2.2; 4.4; 2.2; 2.2; 2.2 UG/.5ML; UG/.5ML; UG/.5ML; UG/.5ML; UG/.5ML; UG/.5ML; UG/.5ML; UG/.5ML; UG/.5ML; UG/.5ML; UG/.5ML; UG/.5ML; UG/.5ML; UG/.5ML; UG/.5ML; UG/.5ML; UG/.5ML; UG/.5ML; UG/.5ML; UG/.5ML
INJECTION, SUSPENSION INTRAMUSCULAR
COMMUNITY
Start: 2023-05-03 | End: 2024-02-20 | Stop reason: ALTCHOICE

## 2023-07-17 NOTE — PROGRESS NOTES
HPI    CC: Presents today for routine eye exam. Pt states no new concerning   vision problems, however, she would like an updated prescription to   purchase ne glasses.    POLO: 6/24/2022 with Dr. Roberts    (-) Changes in vision   (-) Pain  (-) Irritation   (-) Itching   (-) Flashes  (-) Floaters  (+) Glasses wearer  (-) CL wearer  (-) Uses eye gtts    Does patient want a refraction today? yes    (-) Eye injury  (-) Eye surgery   (-)POHx  (-)FOHx    (-)DM  Hemoglobin A1C       Date                     Value               Ref Range             Status                05/03/2023               5.9 (H)             4.0 - 5.6 %           Final                 Last edited by Tamika Estrada MA on 7/17/2023 10:37 AM.            Assessment /Plan     For exam results, see Encounter Report.    Ed patient on exam findings and noted reasons for changes. Recommended getting BP under control and visiting PCP.   OD: diffuse hard exudates inferior nasal to ONH, vascular attenuation, and AV nicking.  OS: vascular attenuation and AV nicking  Dispensed final Rx for FTW, vision stable.   Cataracts are not visually significant, both VA and subjectively to patient. Monitor at annual exam.    - consider macular OCT and fundus photos at next CVE    RTC in 1 year for CVE and DFE or PRN if experience any visual changes.

## 2023-08-01 ENCOUNTER — OFFICE VISIT (OUTPATIENT)
Dept: PODIATRY | Facility: CLINIC | Age: 73
End: 2023-08-01
Payer: MEDICARE

## 2023-08-01 VITALS — HEIGHT: 65 IN | WEIGHT: 238.13 LBS | BODY MASS INDEX: 39.67 KG/M2

## 2023-08-01 DIAGNOSIS — M76.61 TENDONITIS, ACHILLES, RIGHT: Primary | ICD-10-CM

## 2023-08-01 DIAGNOSIS — M21.861 ACQUIRED POSTERIOR EQUINUS OF RIGHT LOWER EXTREMITY: ICD-10-CM

## 2023-08-01 PROCEDURE — 3288F PR FALLS RISK ASSESSMENT DOCUMENTED: ICD-10-PCS | Mod: HCNC,CPTII,S$GLB, | Performed by: PODIATRIST

## 2023-08-01 PROCEDURE — 1159F PR MEDICATION LIST DOCUMENTED IN MEDICAL RECORD: ICD-10-PCS | Mod: HCNC,CPTII,S$GLB, | Performed by: PODIATRIST

## 2023-08-01 PROCEDURE — 3288F FALL RISK ASSESSMENT DOCD: CPT | Mod: HCNC,CPTII,S$GLB, | Performed by: PODIATRIST

## 2023-08-01 PROCEDURE — 99999 PR PBB SHADOW E&M-EST. PATIENT-LVL IV: ICD-10-PCS | Mod: PBBFAC,HCNC,, | Performed by: PODIATRIST

## 2023-08-01 PROCEDURE — 3044F HG A1C LEVEL LT 7.0%: CPT | Mod: HCNC,CPTII,S$GLB, | Performed by: PODIATRIST

## 2023-08-01 PROCEDURE — 1101F PT FALLS ASSESS-DOCD LE1/YR: CPT | Mod: HCNC,CPTII,S$GLB, | Performed by: PODIATRIST

## 2023-08-01 PROCEDURE — 99999 PR PBB SHADOW E&M-EST. PATIENT-LVL IV: CPT | Mod: PBBFAC,HCNC,, | Performed by: PODIATRIST

## 2023-08-01 PROCEDURE — 1126F PR PAIN SEVERITY QUANTIFIED, NO PAIN PRESENT: ICD-10-PCS | Mod: HCNC,CPTII,S$GLB, | Performed by: PODIATRIST

## 2023-08-01 PROCEDURE — 3008F PR BODY MASS INDEX (BMI) DOCUMENTED: ICD-10-PCS | Mod: HCNC,CPTII,S$GLB, | Performed by: PODIATRIST

## 2023-08-01 PROCEDURE — 1101F PR PT FALLS ASSESS DOC 0-1 FALLS W/OUT INJ PAST YR: ICD-10-PCS | Mod: HCNC,CPTII,S$GLB, | Performed by: PODIATRIST

## 2023-08-01 PROCEDURE — 99213 PR OFFICE/OUTPT VISIT, EST, LEVL III, 20-29 MIN: ICD-10-PCS | Mod: HCNC,S$GLB,, | Performed by: PODIATRIST

## 2023-08-01 PROCEDURE — 1160F PR REVIEW ALL MEDS BY PRESCRIBER/CLIN PHARMACIST DOCUMENTED: ICD-10-PCS | Mod: HCNC,CPTII,S$GLB, | Performed by: PODIATRIST

## 2023-08-01 PROCEDURE — 99213 OFFICE O/P EST LOW 20 MIN: CPT | Mod: HCNC,S$GLB,, | Performed by: PODIATRIST

## 2023-08-01 PROCEDURE — 1160F RVW MEDS BY RX/DR IN RCRD: CPT | Mod: HCNC,CPTII,S$GLB, | Performed by: PODIATRIST

## 2023-08-01 PROCEDURE — 1159F MED LIST DOCD IN RCRD: CPT | Mod: HCNC,CPTII,S$GLB, | Performed by: PODIATRIST

## 2023-08-01 PROCEDURE — 3008F BODY MASS INDEX DOCD: CPT | Mod: HCNC,CPTII,S$GLB, | Performed by: PODIATRIST

## 2023-08-01 PROCEDURE — 3044F PR MOST RECENT HEMOGLOBIN A1C LEVEL <7.0%: ICD-10-PCS | Mod: HCNC,CPTII,S$GLB, | Performed by: PODIATRIST

## 2023-08-01 PROCEDURE — 1126F AMNT PAIN NOTED NONE PRSNT: CPT | Mod: HCNC,CPTII,S$GLB, | Performed by: PODIATRIST

## 2023-08-01 NOTE — PATIENT INSTRUCTIONS
"Achilles Tendon Disorders        What Is the Achilles Tendon?    The Achilles tendon is a band of tissue that connects a muscle to a bone. It runs down the back of the lower leg and connects the calf muscle to the heel bone. Also called the heel cord, the Achilles tendon facilitates walking by helping to raise the heel off the ground.      Achilles Tendonitis and Achilles Tendonosis  Two common disorders that occur in the heel cord are Achilles tendonitis and Achilles tendonosis.    Achilles tendonitis is an inflammation of the Achilles tendon. This inflammation is typically short-lived. Over time, if not resolved, the condition may progress to a degeneration of the tendon (Achilles tendonosis), in which the tendon loses its organized structure and is likely to develop microscopic tears. Sometimes the degeneration involves the site where the Achilles tendon attaches to the heel bone. In rare cases, chronic degeneration with or without pain may result in rupture of the tendon.    Causes  As "overuse" disorders, Achilles tendonitis and tendonosis are usually caused by a sudden increase of a repetitive activity involving the Achilles tendon. Such activity puts too much stress on the tendon too quickly, leading to micro-injury of the tendon fibers. Due to this ongoing stress on the tendon, the body is unable to repair the injured tissue. The structure of the tendon is then altered, resulting in continued pain.        Athletes are at high risk for developing disorders of the Achilles tendon. Achilles tendonitis and tendonosis are also common in individuals whose work puts stress on their ankles and feet, such as laborers, as well as in weekend warriors--those who are less conditioned and participate in athletics only on weekends or infrequently.    In addition, people with excessive pronation (flattening of the arch) have a tendency to develop Achilles tendonitis and tendonosis due to the greater demands placed on the " tendon when walking. If these individuals wear shoes without adequate stability, their overpronation could further aggravate the Achilles tendon.    Symptoms  The symptoms associated with Achilles tendonitis and tendonosis include:    Pain--aching, stiffness, soreness or tenderness--within the tendon. This may occur anywhere along the tendons path, beginning with the tendons attachment directly above the heel upward to the region just below the calf muscle. Pain often appears upon arising in the morning or after periods of rest, then improves somewhat with motion but later worsens with increased activity.  Tenderness, or sometimes intense pain, when the sides of the tendon are squeezed. There is less tenderness, however, when pressing directly on the back of the tendon.  When the disorder progresses to degeneration, the tendon may become enlarged and may develop nodules in the area where the tissue is damaged.     Diagnosis  In diagnosing Achilles tendonitis or tendonosis, the surgeon will examine the patients foot and ankle and evaluate the range of motion and condition of the tendon. The extent of the condition can be further assessed with x-rays or other imaging modalities.    Treatment  Treatment approaches for Achilles tendonitis or tendonosis are selected on the basis of how long the injury has been present and the degree of damage to the tendon. In the early stage, when there is sudden (acute) inflammation, one or more of the following options may be recommended:    Immobilization. Immobilization may involve the use of a cast or removable walking boot to reduce forces through the Achilles tendon and promote healing.  Ice. To reduce swelling due to inflammation, apply a bag of ice over a thin towel to the affected area for 20 minutes of each waking hour. Do not put ice directly against the skin.  Oral medications. Nonsteroidal anti-inflammatory drugs (NSAIDs), such as ibuprofen, may be helpful in reducing  the pain and inflammation in the early stage of the condition.  Orthotics. For those with overpronation or gait abnormalities, custom orthotic devices may be prescribed.  Night splints. Night splints help to maintain a stretch in the Achilles tendon during sleep.  Physical therapy. Physical therapy may include strengthening exercises, soft-tissue massage/mobilization, gait and running re-education, stretching and ultrasound therapy.     When Is Surgery Needed?  If nonsurgical approaches fail to restore the tendon to its normal condition, surgery may be necessary. The foot and ankle surgeon will select the best procedure to repair the tendon, based on the extent of the injury, the patients age and activity level, and other factors.    Prevention  To prevent Achilles tendonitis or tendonosis from recurring after surgical or nonsurgical treatment, the foot and ankle surgeon may recommend strengthening and stretching of the calf muscles through daily exercises. Wearing proper shoes for the foot type and activity is also important in preventing recurrence of the condition.      Understanding Achilles Tendonitis    Achilles tendonitis is an overuse injury. It results in inflammation of the Achilles tendon. This tendon is found on the back of the ankle. It links the calf muscle to the heel bone. It helps you do pushing-off movements like running or standing on your toes.     How to say it  [id number]-KILL-[id number]-dun-I-tis   What causes Achilles tendonitis?  Achilles tendonitis can happen if you do an activity like running, walking, or jumping too much. This overuse can strain, or pull, the tendon. It may lead to minor tearing of the tendon. An injury to the lower leg or foot can also cause it.  If you dont warm up before taking part in sports such as basketball, you are more likely to suffer from this condition. You are also more prone to it if you do too much of such an activity too quickly. Proper training and rest can help  prevent it.  Symptoms of Achilles tendonitis  The main symptom of Achilles tendonitis is pain. This pain mostly happens when you move the ankle. The tendon may also feel stiff after a period of no activity, such as sleeping. It may also become swollen. You may hear a crackling sound when you move your ankle.  Treatment for Achilles tendonitis  Symptoms often get better after starting treatment. A full recovery may take several months. Treatments include:  Rest. You should stop or change the activity that caused the injury. The tendon will then have time to heal.  Cold or heat pack. These help reduce pain and swelling.  Prescription or over-the-counter pain medicines. These help reduce pain and swelling.  Shoe inserts. These devices can reduce strain on the Achilles tendon when you move. You may then feel less pain.  Stretching and strengthening exercises. Certain exercises can help you regain flexibility and strength in your Achilles tendon.  Surgery. This option can fix the injured tendon. But you dont often need it unless other treatments dont work.     When to call your healthcare provider   Call your healthcare provider right away if you have any of these:  Fever of 100.4°F (38°C) or higher, or as directed  Pain that gets worse  Symptoms that dont get better, or get worse  New symptoms    Date Last Reviewed: 3/10/2016  © 8360-3339 [employer]. [address] [place] [address], [place] [address]. All rights reserved. This information is not intended as a substitute for professional medical care. Always follow your healthcare professional's instructions.        Understanding Retrocalcaneal Bursitis    Retrocalcaneal bursitis is a condition that causes heel pain. This pain spreads from the bursa located between the Achilles tendon and the heel bone. This bursa normally provides a cushion as you walk.  A bursa is a fluid-filled sac. Your body has many of them. They are found in areas where rubbing may occur,  such as between tendons and bones. The fluid inside them helps ease friction during movement. If they become injured or irritated, you may feel pain.     How to say it  [id number]-[id number]-GUS-carey bur-SI-nuha   What causes retrocalcaneal bursitis?  This type of bursitis is mainly caused by using the ankle a lot, such as running uphill. A sudden increase in running or similar activities can also lead to it. Athletes who wear tight-fitting shoes while practicing or exercising are more prone to the condition. So too are those who dont stretch or warm up properly before such activities. Some cases may result from an infection or a disease such as arthritis.  Symptoms of retrocalcaneal bursitis  Severe pain and swelling in the heel are typical symptoms. You may also notice tenderness when the heel is touched. Tight-fitting shoes may become hard to wear. You may hear a crackling sound when you flex your foot.  Treatment for retrocalcaneal bursitis  The aim of treatment is to ease symptoms so that the bursa has time to heal. Treatment may include:  Rest. You may need to alter or limit activities that cause heel pain. These include high-impact activities like running.  Prescription or over-the-counter medicines. These help reduce pain and swelling.  Cold packs or heat packs. Thesemay ease pain.  Shoe inserts or padding. Devices such as heel cups or pads for the back of your heel can ease discomfort when moving.  Footwear. You should avoid wearing tight-fitting shoes or those that rub the back of your heel. Shoes with an open-back heel, such as clogs, may help.  Stretching exercises. Gentle stretching movements can restore flexibility in your ankle and foot. They can also help with pain.  Steroid injection. A needle is used to inject a pain reliever and steroid into the affected bursa. This shot can relieve pain and reduce swelling for several weeks.  Surgery. This treatment is rarely needed unless other options dont  work.  Complications of retrocalcaneal bursitis  Limited range of motion in the affected foot and ankle  Infected bursa     When to call your healthcare provider  Call your healthcare provider right away if you have any of these:  Fever of 100.4°F (38°C) or higher, or as directed  Pain that gets worse  Symptoms that dont get better, or get worse  New symptoms    Date Last Reviewed: 3/10/2016  © 4246-5236 [employer]. [address] [place] [id number]. All rights reserved. This information is not intended as a substitute for professional medical care. Always follow your healthcare professional's instructions.          Treating Tendonitis of the Foot  Your healthcare provider's first concern is to reduce your symptoms. Using ice and heat, taking medicines, and limiting activity help control pain and swelling. Follow all of your healthcare provider's instructions. Returning to activity too soon may cause your symptoms to come back.    Ice and heat  Ice helps prevent swelling and reduce pain. Place ice on the painful area for 10 to 15 minutes. Repeat the icing several times a day. If ?you have had the problem for a while, using heat may help. Apply a heating pad or hot towels to the tendon for 20 to 30 minutes 2 or 3 times a day.  Medicines  Your healthcare provider may tell you to take ibuprofen or other anti-inflammatory medicines. These reduce pain and swelling. Take them as directed. Dont wait until you feel pain. In more severe cases, cortisone may be injected to relieve pain.  Limiting activities  Rest allows the tissues in your foot to heal. Stay off your feet for a few days, then slowly work back into activity. If you do high-impact activities, such as running or aerobics, try other activities that place less strain on your foot. Cycling and swimming are good choices.  Date Last Reviewed: 9/21/2015  © 8574-1628 [employer]. [address] [place] [address]. All  "rights reserved. This information is not intended as a substitute for professional medical care. Always follow your healthcare professional's instructions.        Eula's Deformity      What Is Eula's Deformity?   Eula's deformity is a bony enlargement on the back of the heel. The soft tissue near the Achilles tendon becomes irritated when the bony enlargement rubs against shoes. This often leads to painful bursitis, which is an inflammation of the bursa (a fluid-filled sac between the tendon and bone).  Causes        Eula's deformity is often called "pump bump" because the rigid backs of pump-style shoes can create pressure that aggravates the enlargement when walking. In fact, any shoes with a rigid back, such as ice skates, men's dress shoes or women's pumps, can cause this irritation.  To some extent, heredity plays a role in Eula's deformity. Inherited foot structures that can make one prone to developing this condition include:  A high-arched foot   A tight Achilles tendon   A tendency to walk on the outside of the heel.  Symptoms  Eula's deformity can occur in one or both feet. The symptoms include:  A noticeable bump on the back of the heel   Pain in the area where the Achilles tendon attaches to the heel   Swelling in the back of the heel   Redness near the inflamed tissue  Diagnosis  After evaluating the patient's symptoms, the foot and ankle surgeon will examine the foot. In addition, x-rays will be ordered to help the surgeon evaluate the structure of the heel bone.  Nonsurgical Treatment  Nonsurgical treatment of Eula's deformity is aimed at reducing the inflammation of the bursa. While these approaches can resolve the pain and inflammation, they will not shrink the bony protrusion. Nonsurgical treatment can include one or more of the following:  Medication. Oral nonsteroidal anti-inflammatory drugs (NSAIDs), such as ibuprofen, may be recommended to reduce the pain and inflammation. " Ice. To reduce swelling, apply an ice pack to the inflamed area, placing a thin towel between the ice and the skin. Use ice for 20 minutes and then wait at least 40 minutes before icing again.   Exercises. Stretching exercises help relieve tension from the Achilles tendon. These exercises are especially important for the patient who has a tight heel cord.   Heel lifts. Patients with high arches may find that heel lifts placed inside the shoe decrease the pressure on the heel.   Heel pads. Pads placed inside the shoe cushion the heel and may help reduce irritation when walking.   Shoe modification. Backless or soft backed shoes help avoid or minimize irritation.   Physical therapy. Physical therapy modalities, such as ultrasound, can help to reduce inflammation.   Orthotic devices. Custom arch supports control the motion in the foot.   Immobilization. In some cases, casting may be necessary.  When Is Surgery Needed?  If nonsurgical treatment fails to provide adequate pain relief, surgery may be needed. The foot and ankle surgeon will determine the procedure that is best suited to your case. It is important to follow the surgeon's instructions for postsurgical care.  Prevention  To help prevent a recurrence of Eula's deformity:  wear appropriate shoes; avoid shoes with a rigid heel back   use arch supports or orthotic devices   perform stretching exercises to prevent the Achilles tendon from tightening   avoid running on hard surfaces and running uphill        Equinus          What Is Equinus?    Equinus is a condition in which the upward bending motion of the ankle joint is limited. Someone with equinus lacks the flexibility to bring the top of the foot toward the front of the leg. Equinus can occur in one or both feet. When it involves both feet, the limitation of motion is sometimes worse in one foot than in the other.    People with equinus develop ways to compensate for their limited ankle motion, and this  often leads to other foot, leg or back problems. The most common methods of compensation are flattening of the arch or picking up the heel early when walking, placing increased pressure on the ball of the foot. Other patients compensate by toe walking, while a smaller number take steps by bending abnormally at the hip or knee.    Causes  There are several possible causes for the limited range of ankle motion. Often, it is due to tightness in the Achilles tendon or calf muscles (the soleus muscle and/or gastrocnemius muscle). In some patients, this tightness is congenital (present at birth), and sometimes it is an inherited trait. Other patients acquire the tightness from being in a cast, being on crutches or frequently wearing high-heeled shoes. In addition, diabetes can affect the fibers of the Achilles tendon and cause tightness. Sometimes equinus is related to a bone blocking the ankle motion. For example, a fragment of a broken bone following an ankle injury, or bone block, can get in the way and restrict motion. Equinus may also result from one leg being shorter than the other. Less often, equinus is caused by spasms in the calf muscle. These spasms may be signs of an underlying neurologic disorder.      Foot Problems Related to Equinus  Depending on how a patient compensates for the inability to bend properly at the ankle, a variety of foot conditions can develop, including:    Plantar fasciitis (arch/heel pain)  Calf cramping  Tendonitis (inflammation in the Achilles tendon)  Metatarsalgia (pain and/or callusing on the ball of the foot)  Flatfoot  Arthritis of the midfoot (middle area of the foot)  Pressure sores on the ball of the foot or the arch  Bunions and hammertoes  Ankle pain  Shin splints     Diagnosis  Most patients with equinus are unaware they have this condition when they first visit the doctor. Instead, they come to the doctor seeking relief for foot problems associated with equinus.    To  diagnose equinus, the foot and ankle surgeon will evaluate the ankle's range of motion when the knee is flexed (bent) as well as extended (straightened). This enables the surgeon to identify whether the tendon or muscle is tight and to assess whether bone is interfering with ankle motion. X-rays may also be ordered. In some cases, the foot and ankle surgeon may refer the patient for neurologic evaluation.    Nonsurgical Treatment  Treatment includes strategies aimed at relieving the symptoms and conditions associated with equinus. In addition, the patient is treated for the equinus itself through one or more of the following options:    Night splint. The foot may be placed in a splint at night to keep it in a position that helps reduce tightness of the calf muscle.  Heel lifts. Placing heel lifts inside the shoes or wearing shoes with a moderate heel takes stress off the Achilles tendon when walking and may reduce symptoms.  Arch supports or orthotic devices. Custom orthotic devices that fit into the shoe are often prescribed to keep weight distributed properly and to help control muscle/tendon imbalance.  Physical therapy. To help remedy muscle tightness, exercises that stretch the calf muscle(s) are recommended.    When Is Surgery Needed?  In some cases, surgery may be needed to correct the cause of equinus if it is related to a tight tendon or a bone blocking the ankle motion. The foot and ankle surgeon will determine the type of procedure that is best suited to the individual patient.    What Is a Gastrocnemius Release?  The gastrocnemius (gastroc) and the soleus are two muscles that make up the calf. The gastroc is the larger and more superficial of the two muscles. The soleus is a deeper muscle within the lower leg. The gastroc tendon combines with the soleus tendon to form the Achilles tendon.  Tightness in the calf can limit how for the ankle can flex up. This may make it difficult to walk with the heel on  the floor. Over time this can cause problems such as pain and deformity. Calf tightness may contribute to many foot problems, including heel pain, Achilles tendon pain, flatfoot deformity, toe pain, and bunions.  A gastrocnemius release lengthens the gastrocnemius tendon. This is done to increase the flexibility of the calf muscle, which can decrease pressure at the front of the foot, improve function, and decrease deformity.    Diagnosis  This surgery is done in patients with tightness of the gastroc that has not improved with stretching exercises. This procedure can be combined with other reconstructive procedures or be performed by itself.  Surgery can be avoided when appropriate range of motion and flexibility can be obtained with conservative treatment (stretching). It should also be avoided if there are contractures of multiple tendons in the leg, and not just the gastroc.     Treatment  The surgery can be performed through several different incisions. Most commonly, a small incision is made on the inner side of the lower leg. Sometimes an incision directly in the back of the calf is used, or even an endoscopic incision, which is about ½ inch. Once the gastroc tendon is identified, it is  from the underlying muscle belly of the soleus, then cut straight across. Once the tendon is released, the ankle is flexed up and an increased range of motion is noted intra-operatively.     Recovery  For the first two weeks after surgery, the patient typically is immobilized in a splint or boot. It is important to keep the ankle in a proper position while the tendon is healing. A cramping feeling in the back of the calf is normal. Gentle range of motion and stretching begin once the ankle is removed from the splint/boot. Timing can vary depending upon what other procedures are done.     Risks and Complications  After a gastroc release, some patients experience nerve injury that results in irritation or numbness over  the outside of the heel. This usually is temporary. In addition, some patients may notice a difference in the appearance of one calf compared to the other and temporary calf weakness.    FAQs  Why are my calf muscles tight?   Most frequently a tight calf muscle is an inherited problem that only causes problems later in life. Other reasons for calf tightness are nerve injuries, muscle problems, and other medical problems like stroke and diabetes. People can also get tight calf muscles after trauma to the leg, ankle, or foot.    Will a gastrocnemius lengthening affect my strength or ability to walk?  This procedure will cause some weakness but most patients will not notice it. Some patients may have a subtle limp, but this typically resolves within six months of surgery.              Ankle Dorsiflexion/Plantarflexion (Flexibility)    Sit on the floor or in bed with your legs straight in front of you.  Point both feet. Then flex both feet.  Do this 10 to 30 times in a row.  Repeat this exercise 2 times a day, or as instructed.  Date Last Reviewed: 5/1/2016  © 2804-4085 [employer]. [address] [place] [address]. All rights reserved. This information is not intended as a substitute for professional medical care. Always follow your healthcare professional's instructions.        Soleus Stretch (Flexibility)    Stand facing a wall from 3 feet away. Take one step toward the wall with your right foot.  Place both palms on the wall. Bend both knees and lean forward. Keep both heels on the floor.  Hold for 30 to 60 seconds. Then relax both legs. Repeat the exercise 2 times.  Switch legs and repeat.  Repeat this exercise 3 times a day, or as instructed.     Tip: Dont bounce while youre stretching.   Date Last Reviewed: 3/10/2016  © 2016-7754 [employer]. [address] [place] [address]. All rights reserved. This information is not intended as a substitute for professional  medical care. Always follow your healthcare professional's instructions.

## 2023-08-01 NOTE — PROGRESS NOTES
[facility] PODIATRY  [address] [place] [address], [address]  [facility] [id number]  Dept: [phone]  Dept Fax: [phone]    Juarez Murray Jr., BECK     Assessment:   MDM    Coding  1. Tendonitis, Achilles, right  Ambulatory referral/consult to Physical/Occupational Therapy      2. Acquired posterior equinus of right lower extremity  Ambulatory referral/consult to Physical/Occupational Therapy          Plan:     Procedures  1. Tendonitis, Achilles, right  -     Ambulatory referral/consult to Physical/Occupational Therapy; Future; Expected date: 08/08/2023    2. Acquired posterior equinus of right lower extremity  -     Ambulatory referral/consult to Physical/Occupational Therapy; Future; Expected date: 08/08/2023      Ludmila was seen today for heel pain and tendonitis.    Diagnoses and all orders for this visit:    Tendonitis, Achilles, right  -     Ambulatory referral/consult to Physical/Occupational Therapy; Future    Acquired posterior equinus of right lower extremity  -     Ambulatory referral/consult to Physical/Occupational Therapy; Future        -pt seen, evaluated, and managed  -dx discussed in detail. All questions/concerns addressed  -all tx options discussed. All alternatives, risks, benefits of all txs discussed  -the patient was educated about the diagnosis  -We discussed conservative care options possible including but not limited to shoe wear and/or padding, bracing/strapping, at home ROM, formal PT, medical therapy, injection therapy  - The utilization of NSAIDs can be considered but their benefit has to be tempered against the risk of GI/ concerns  -XR reviewed  -labs reviewed by me: ok for medrol dose pack  -implemented icing/stretching regimen  -heel lifts dispensed      -rxs dispensed: none  -referrals: PT  -WB: wbat      Follow up if symptoms worsen or fail to improve.    Subjective:      Patient ID: Ludmila Jimenez is a 73 y.o. female.    Chief Complaint:   Chief Complaint    Patient presents with    Heel Pain     Pat    Tendonitis     Patient presents today for a follow up for her right heel pain. Patent stated she is doing better no pain.        CC - posterior heel pain: patient presents to the clinic complaining of heel pain in the right foot. The pain is described as sharp. The onset of the pain was gradual and has worsened over the past several wks. Patient rates the pain as 7/10. Patient denies a history of trauma. Prior treatments include none.      8/1/23:  Hx as above. Pain improving. Been in [facility].      Last Podiatry Enc: Visit date not found  Last Enc w/ Me: Visit date not found    Outside reports reviewed: historical medical records.  Family hx: as below  Past Medical History:   Diagnosis Date    High cholesterol     Hypertension      Past Surgical History:   Procedure Laterality Date    COLONOSCOPY  02/27/2020    One 4 mm polyp in the ascending colon.Repeat colonoscopy  in 7-10 years.    HYSTERECTOMY       Family History   Problem Relation Age of Onset    Hypertension Sister     No Known Problems Brother     Hypertension Daughter     No Known Problems Daughter     No Known Problems Son     No Known Problems Son     No Known Problems Son     No Known Problems Son      Current Outpatient Medications   Medication Sig Dispense Refill    aspirin (ECOTRIN) 81 MG EC tablet Take 81 mg by mouth once daily.      atorvastatin (LIPITOR) 10 MG tablet Take 1 tablet (10 mg total) by mouth once daily. 90 tablet 3    blood-glucose meter (TRUE METRIX GLUCOSE METER) kit Check glucose as directed 1 each 0    hydroCHLOROthiazide (HYDRODIURIL) 25 MG tablet Take 1 tablet (25 mg total) by mouth once daily. 90 tablet 3    methylPREDNISolone (MEDROL DOSEPACK) 4 mg tablet use as directed 21 each 0    metoprolol succinate (TOPROL-XL) 50 MG 24 hr tablet Take 1 tablet (50 mg total) by mouth once daily. 90 tablet 3    multivit-min/iron/folic/lutein (CENTRUM SILVER WOMEN ORAL) Take 1  tablet by mouth Daily.      naproxen (NAPROSYN) 500 MG tablet TAKE 1 TABLET 2 TIMES DAILY WITH MEALS FOR 10 DAYS 20 tablet 0    PREVNAR 20, PF, 0.5 mL Syrg injection       TRUE METRIX GLUCOSE TEST STRIP Strp TEST BLOOD SUGAR EVERY  strip 3    TRUEPLUS LANCETS 33 gauge Misc TEST BLOOD SUGAR EVERY  each 3    azelastine (ASTELIN) 137 mcg (0.1 %) nasal spray 1 spray (137 mcg total) by Nasal route 2 (two) times daily. 30 mL 0     No current facility-administered medications for this visit.     Review of patient's allergies indicates:  No Known Allergies  Social History     Socioeconomic History    Marital status:    Tobacco Use    Smoking status: Never     Passive exposure: Never    Smokeless tobacco: Never   Substance and Sexual Activity    Alcohol use: Not Currently     Alcohol/week: 1.0 standard drink of alcohol     Types: 1 Glasses of wine per week     Comment: very seldom     Drug use: No    Sexual activity: Not Currently     Social Determinants of Health     Financial Resource Strain: Low Risk  (5/5/2023)    Overall Financial Resource Strain (CARDIA)     Difficulty of Paying Living Expenses: Not hard at all   Food Insecurity: No Food Insecurity (5/5/2023)    Hunger Vital Sign     Worried About Running Out of Food in the Last Year: Never true     Ran Out of Food in the Last Year: Never true   Transportation Needs: No Transportation Needs (5/5/2023)    PRAPARE - Transportation     Lack of Transportation (Medical): No     Lack of Transportation (Non-Medical): No   Physical Activity: Sufficiently Active (5/5/2023)    Exercise Vital Sign     Days of Exercise per Week: 7 days     Minutes of Exercise per Session: 60 min   Stress: No Stress Concern Present (5/5/2023)    [place] [employer] - Occupational Stress Questionnaire     Feeling of Stress : Not at all   Social Connections: Moderately Isolated (5/5/2023)    Social Connection and Isolation Panel [NHANES]     " Frequency of Communication with Friends and Family: More than three times a week     Frequency of Social Gatherings with Friends and Family: More than three times a week     Attends Christianity Services: More than 4 times per year     Active Member of Clubs or Organizations: No     Attends Club or Organization Meetings: Never     Marital Status:    Housing Stability: Low Risk  (5/5/2023)    Housing Stability Vital Sign     Unable to Pay for Housing in the Last Year: No     Number of Places Lived in the Last Year: 1     Unstable Housing in the Last Year: No       ROS    REVIEW OF SYSTEMS: Negative as documented below as well as positive findings in bold.       Constitutional  Respiratory  Gastrointestinal  Skin   - Fever - Cough - Heartburn - Rash   - Chills - Spit blood - Nausea - Itching   - Weight Loss - Shortness of breath - Vomiting - Nail pain   - Malaise/Fatigue - Wheezing - Abdominal Pain  Wound/Ulcer   - Weight Gain   - Blood in Stool  Poor wound healing       - Diarrhea          Cardiovascular  Genitourinary  Neurological  HEENT   - Chest Pain - Dysuria - Burning Sensation of feet - Headache   - Palpitations - Hematuria - Tingling / Paresthesia - Congestion   - Pain at night in legs - Flank Pain - Dizziness - Sore Throat   - Cramping   - Tremor - Blurred Vision   - Leg Swelling   - Sensory Change - Double Vision   - Dizzy when standing   - Speech Change - Eye Redness       - Focal Weakness - Dry Eyes       - Loss of Consciousness          Endocrine  Musculoskeletal  Psychiatric   - Cold intolerance - Muscle Pain - Depression   - Heat intolerance - Neck Pain - Insomnia   - Anemia - Joint Pain - Memory Loss   -  Easy bruising, bleeding - Heel pain - Anxiety      Toe Pain        Leg/Ankle/Foot Pain         Objective:     Ht 5' 5" (1.651 m)   Wt 108 kg (238 lb 1.6 oz)   LMP  (LMP Unknown)   BMI 39.62 kg/m²   Vitals:    08/01/23 1018   Weight: 108 kg (238 lb 1.6 oz)   Height: 5' 5" (1.651 m) "   PainSc: 0-No pain   PainLoc: Foot         Physical Exam    General Appearance:   Patient appears well developed, well nourished  Patient appears stated age    Psychiatric:   Patient is oriented to time, place, and person.  Patient has appropriate mood and affect    Neck:  Trachea Midline  No visible masses    Respiratory/Ears:  No distress or labored breathing.  Able to differentiate between normal talking voice and whisper.  Able to follow commands    Eyes:  Visual Acuity intact  Lids and conjunctivae normal. No discoloration noted.    Foot Exam  Physical Exam  Ortho Exam  Ortho/SPM Exam  Physical Exam  Foot/Ankle Musculoskeletal Exam    R LE exam con't:  V:  DP 2/4, PT 2/4   CRT< 3s to all digits tested   Anterior tibial and popliteal lymph nodes are w/o abnormality    edema present, varicosities absent    N: Patient displays normal ankle reflexes   SILT in SP/DP/T/Nani/Saph distributions    Ortho: +Motor EHL/FHL/TA/GA   +TTP R posterior calcaneus in area of achilles insertion   Enlarged bony prominence is  present   Compartments soft/compressible. No pain on passive stretch of big toe. No calf  Pain.   equinus deformity present    Derm: skin intact, skin warm and dry, skin without ulcers or lesions, skin without induration, nails normal    Imaging / Labs:    No results found.        Note: This was dictated using a computer transcription program. Although proofread, it may contain computer transcription errors and phonetic errors. Other human proofreading errors may also exist. Corrections may be performed at a later time. Please contact us for any clarification if needed.    Juarez Murray DPM  [facility]

## 2023-08-09 PROBLEM — Z74.09 IMPAIRED FUNCTIONAL MOBILITY, BALANCE, GAIT, AND ENDURANCE: Status: ACTIVE | Noted: 2023-08-09

## 2023-08-09 PROBLEM — R29.898 DECREASED STRENGTH OF LOWER EXTREMITY: Status: ACTIVE | Noted: 2023-08-09

## 2023-08-09 PROBLEM — M25.671 DECREASED RANGE OF MOTION OF RIGHT ANKLE: Status: ACTIVE | Noted: 2023-08-09

## 2023-11-08 ENCOUNTER — OFFICE VISIT (OUTPATIENT)
Dept: FAMILY MEDICINE | Facility: CLINIC | Age: 73
End: 2023-11-08
Payer: MEDICARE

## 2023-11-08 VITALS
HEIGHT: 65 IN | BODY MASS INDEX: 40.66 KG/M2 | HEART RATE: 64 BPM | DIASTOLIC BLOOD PRESSURE: 78 MMHG | WEIGHT: 244.06 LBS | SYSTOLIC BLOOD PRESSURE: 138 MMHG | OXYGEN SATURATION: 98 %

## 2023-11-08 DIAGNOSIS — E66.01 MORBID OBESITY WITH BODY MASS INDEX OF 40.0-44.9 IN ADULT: Chronic | ICD-10-CM

## 2023-11-08 DIAGNOSIS — Z12.31 BREAST CANCER SCREENING BY MAMMOGRAM: ICD-10-CM

## 2023-11-08 DIAGNOSIS — I10 PRIMARY HYPERTENSION: Primary | Chronic | ICD-10-CM

## 2023-11-08 DIAGNOSIS — F32.0 MAJOR DEPRESSIVE DISORDER, SINGLE EPISODE, MILD: Chronic | ICD-10-CM

## 2023-11-08 DIAGNOSIS — R73.03 PREDIABETES: ICD-10-CM

## 2023-11-08 DIAGNOSIS — E78.2 MIXED HYPERLIPIDEMIA: ICD-10-CM

## 2023-11-08 PROCEDURE — G0008 FLU VACCINE - QUADRIVALENT - ADJUVANTED: ICD-10-PCS | Mod: S$GLB,,, | Performed by: FAMILY MEDICINE

## 2023-11-08 PROCEDURE — 3078F PR MOST RECENT DIASTOLIC BLOOD PRESSURE < 80 MM HG: ICD-10-PCS | Mod: CPTII,S$GLB,, | Performed by: FAMILY MEDICINE

## 2023-11-08 PROCEDURE — 1126F PR PAIN SEVERITY QUANTIFIED, NO PAIN PRESENT: ICD-10-PCS | Mod: CPTII,S$GLB,, | Performed by: FAMILY MEDICINE

## 2023-11-08 PROCEDURE — 3075F PR MOST RECENT SYSTOLIC BLOOD PRESS GE 130-139MM HG: ICD-10-PCS | Mod: CPTII,S$GLB,, | Performed by: FAMILY MEDICINE

## 2023-11-08 PROCEDURE — 99999 PR PBB SHADOW E&M-EST. PATIENT-LVL IV: ICD-10-PCS | Mod: PBBFAC,,, | Performed by: FAMILY MEDICINE

## 2023-11-08 PROCEDURE — 1101F PT FALLS ASSESS-DOCD LE1/YR: CPT | Mod: CPTII,S$GLB,, | Performed by: FAMILY MEDICINE

## 2023-11-08 PROCEDURE — 3044F HG A1C LEVEL LT 7.0%: CPT | Mod: CPTII,S$GLB,, | Performed by: FAMILY MEDICINE

## 2023-11-08 PROCEDURE — 3008F PR BODY MASS INDEX (BMI) DOCUMENTED: ICD-10-PCS | Mod: CPTII,S$GLB,, | Performed by: FAMILY MEDICINE

## 2023-11-08 PROCEDURE — 90694 FLU VACCINE - QUADRIVALENT - ADJUVANTED: ICD-10-PCS | Mod: S$GLB,,, | Performed by: FAMILY MEDICINE

## 2023-11-08 PROCEDURE — 1159F MED LIST DOCD IN RCRD: CPT | Mod: CPTII,S$GLB,, | Performed by: FAMILY MEDICINE

## 2023-11-08 PROCEDURE — 3288F FALL RISK ASSESSMENT DOCD: CPT | Mod: CPTII,S$GLB,, | Performed by: FAMILY MEDICINE

## 2023-11-08 PROCEDURE — 1159F PR MEDICATION LIST DOCUMENTED IN MEDICAL RECORD: ICD-10-PCS | Mod: CPTII,S$GLB,, | Performed by: FAMILY MEDICINE

## 2023-11-08 PROCEDURE — 1126F AMNT PAIN NOTED NONE PRSNT: CPT | Mod: CPTII,S$GLB,, | Performed by: FAMILY MEDICINE

## 2023-11-08 PROCEDURE — 3078F DIAST BP <80 MM HG: CPT | Mod: CPTII,S$GLB,, | Performed by: FAMILY MEDICINE

## 2023-11-08 PROCEDURE — 1160F RVW MEDS BY RX/DR IN RCRD: CPT | Mod: CPTII,S$GLB,, | Performed by: FAMILY MEDICINE

## 2023-11-08 PROCEDURE — 3044F PR MOST RECENT HEMOGLOBIN A1C LEVEL <7.0%: ICD-10-PCS | Mod: CPTII,S$GLB,, | Performed by: FAMILY MEDICINE

## 2023-11-08 PROCEDURE — 99214 OFFICE O/P EST MOD 30 MIN: CPT | Mod: S$GLB,,, | Performed by: FAMILY MEDICINE

## 2023-11-08 PROCEDURE — 99999 PR PBB SHADOW E&M-EST. PATIENT-LVL IV: CPT | Mod: PBBFAC,,, | Performed by: FAMILY MEDICINE

## 2023-11-08 PROCEDURE — 99214 PR OFFICE/OUTPT VISIT, EST, LEVL IV, 30-39 MIN: ICD-10-PCS | Mod: S$GLB,,, | Performed by: FAMILY MEDICINE

## 2023-11-08 PROCEDURE — 1160F PR REVIEW ALL MEDS BY PRESCRIBER/CLIN PHARMACIST DOCUMENTED: ICD-10-PCS | Mod: CPTII,S$GLB,, | Performed by: FAMILY MEDICINE

## 2023-11-08 PROCEDURE — 90694 VACC AIIV4 NO PRSRV 0.5ML IM: CPT | Mod: S$GLB,,, | Performed by: FAMILY MEDICINE

## 2023-11-08 PROCEDURE — 1101F PR PT FALLS ASSESS DOC 0-1 FALLS W/OUT INJ PAST YR: ICD-10-PCS | Mod: CPTII,S$GLB,, | Performed by: FAMILY MEDICINE

## 2023-11-08 PROCEDURE — 3008F BODY MASS INDEX DOCD: CPT | Mod: CPTII,S$GLB,, | Performed by: FAMILY MEDICINE

## 2023-11-08 PROCEDURE — 3288F PR FALLS RISK ASSESSMENT DOCUMENTED: ICD-10-PCS | Mod: CPTII,S$GLB,, | Performed by: FAMILY MEDICINE

## 2023-11-08 PROCEDURE — 3075F SYST BP GE 130 - 139MM HG: CPT | Mod: CPTII,S$GLB,, | Performed by: FAMILY MEDICINE

## 2023-11-08 PROCEDURE — G0008 ADMIN INFLUENZA VIRUS VAC: HCPCS | Mod: S$GLB,,, | Performed by: FAMILY MEDICINE

## 2023-11-08 RX ORDER — HYDROCHLOROTHIAZIDE 25 MG/1
25 TABLET ORAL DAILY
Qty: 90 TABLET | Refills: 3 | Status: SHIPPED | OUTPATIENT
Start: 2023-11-08

## 2023-11-08 RX ORDER — ATORVASTATIN CALCIUM 10 MG/1
10 TABLET, FILM COATED ORAL DAILY
Qty: 90 TABLET | Refills: 3 | Status: SHIPPED | OUTPATIENT
Start: 2023-11-08

## 2023-11-08 RX ORDER — METOPROLOL SUCCINATE 50 MG/1
50 TABLET, EXTENDED RELEASE ORAL DAILY
Qty: 90 TABLET | Refills: 3 | Status: SHIPPED | OUTPATIENT
Start: 2023-11-08

## 2023-11-08 NOTE — PROGRESS NOTES
PATIENT VISIT FAMILY MEDICINE    CC:   Chief Complaint   Patient presents with    Follow-up    Hypertension    Diabetes       HPI: Ludmila Jimenez  is a 73 y.o. female:    Patient is known to me.  Patient presents routine follow up on chronic conditions    Patient doing well overall, taking medications as prescribed and with no acute concerns.     Has gained weight. She attributes this to diet.       PMHX:   Past Medical History:   Diagnosis Date    High cholesterol     Hypertension        PSHX:   Past Surgical History:   Procedure Laterality Date    COLONOSCOPY  02/27/2020    One 4 mm polyp in the ascending colon.Repeat colonoscopy  in 7-10 years.    HYSTERECTOMY         FAMHX:   Family History   Problem Relation Age of Onset    Hypertension Sister     No Known Problems Brother     Hypertension Daughter     No Known Problems Daughter     No Known Problems Son     No Known Problems Son     No Known Problems Son     No Known Problems Son        SOCHX:   Social History     Socioeconomic History    Marital status:    Tobacco Use    Smoking status: Never     Passive exposure: Never    Smokeless tobacco: Never   Substance and Sexual Activity    Alcohol use: Not Currently     Alcohol/week: 1.0 standard drink of alcohol     Types: 1 Glasses of wine per week     Comment: very seldom     Drug use: No    Sexual activity: Not Currently     Social Determinants of Health     Financial Resource Strain: Low Risk  (5/5/2023)    Overall Financial Resource Strain (CARDIA)     Difficulty of Paying Living Expenses: Not hard at all   Food Insecurity: No Food Insecurity (5/5/2023)    Hunger Vital Sign     Worried About Running Out of Food in the Last Year: Never true     Ran Out of Food in the Last Year: Never true   Transportation Needs: No Transportation Needs (5/5/2023)    PRAPARE - Transportation     Lack of Transportation (Medical): No     Lack of Transportation (Non-Medical): No   Physical Activity: Sufficiently Active  (5/5/2023)    Exercise Vital Sign     Days of Exercise per Week: 7 days     Minutes of Exercise per Session: 60 min   Stress: No Stress Concern Present (5/5/2023)    [place] [employer] - Occupational Stress Questionnaire     Feeling of Stress : Not at all   Social Connections: Moderately Isolated (5/5/2023)    Social Connection and Isolation Panel [NHANES]     Frequency of Communication with Friends and Family: More than three times a week     Frequency of Social Gatherings with Friends and Family: More than three times a week     Attends Zoroastrian Services: More than 4 times per year     Active Member of Clubs or Organizations: No     Attends Club or Organization Meetings: Never     Marital Status:    Housing Stability: Low Risk  (5/5/2023)    Housing Stability Vital Sign     Unable to Pay for Housing in the Last Year: No     Number of Places Lived in the Last Year: 1     Unstable Housing in the Last Year: No       ALLERGIES: Review of patient's allergies indicates:  No Known Allergies    MEDS:   Current Outpatient Medications:     aspirin (ECOTRIN) 81 MG EC tablet, Take 81 mg by mouth once daily., Disp: , Rfl:     azelastine (ASTELIN) 137 mcg (0.1 %) nasal spray, 1 spray (137 mcg total) by Nasal route 2 (two) times daily., Disp: 30 mL, Rfl: 0    multivit-min/iron/folic/lutein (CENTRUM SILVER WOMEN ORAL), Take 1 tablet by mouth Daily., Disp: , Rfl:     atorvastatin (LIPITOR) 10 MG tablet, Take 1 tablet (10 mg total) by mouth once daily., Disp: 90 tablet, Rfl: 3    blood-glucose meter (TRUE METRIX GLUCOSE METER) kit, Check glucose as directed, Disp: 1 each, Rfl: 0    hydroCHLOROthiazide (HYDRODIURIL) 25 MG tablet, Take 1 tablet (25 mg total) by mouth once daily., Disp: 90 tablet, Rfl: 3    metoprolol succinate (TOPROL-XL) 50 MG 24 hr tablet, Take 1 tablet (50 mg total) by mouth once daily., Disp: 90 tablet, Rfl: 3    PREVNAR 20, PF, 0.5 mL Syrg injection, , Disp: , Rfl:     TRUE METRIX  "GLUCOSE TEST STRIP Strp, TEST BLOOD SUGAR EVERY DAY, Disp: 100 strip, Rfl: 3    TRUEPLUS LANCETS 33 gauge Misc, TEST BLOOD SUGAR EVERY DAY, Disp: 100 each, Rfl: 3    OBJECTIVE:   Vitals:    11/08/23 0948   BP: 138/78   BP Location: Left arm   Patient Position: Sitting   BP Method: Large (Manual)   Pulse: 64   SpO2: 98%   Weight: 110.7 kg (244 lb 0.8 oz)   Height: 5' 5" (1.651 m)     Body mass index is 40.61 kg/m².      Physical Exam  Vitals and nursing note reviewed.   Constitutional:       Appearance: Normal appearance.   HENT:      Head: Normocephalic.   Eyes:      General:         Right eye: No discharge.         Left eye: No discharge.      Extraocular Movements: Extraocular movements intact.   Cardiovascular:      Rate and Rhythm: Normal rate and regular rhythm.      Heart sounds: Normal heart sounds.   Pulmonary:      Effort: Pulmonary effort is normal.      Breath sounds: Normal breath sounds.   Skin:     Comments: No obvious rash on exposed skin   Neurological:      Mental Status: She is alert.   Psychiatric:         Behavior: Behavior normal.           LABS:   A1C:  Recent Labs   Lab 11/08/23  1044   Hemoglobin A1C 6.1 H     CBC:  Recent Labs   Lab 05/03/23  1002   WBC 6.12   RBC 4.54   Hemoglobin 12.5   Hematocrit 39.7   Platelets 322   MCV 87   MCH 27.5   MCHC 31.5 L     CMP:  Recent Labs   Lab 05/03/23  1002   Glucose 106   Calcium 9.2   Albumin 4.0   Total Protein 7.9   Sodium 139   Potassium 4.1   CO2 33 H   Chloride 101   BUN 12   Creatinine 0.75   Alkaline Phosphatase 96   ALT 17   AST 21   Total Bilirubin 0.3     LIPIDS:  Recent Labs   Lab 12/11/20  0959 10/28/21  1436 05/03/23  1002   TSH 0.682  --   --    HDL 51   < > 53   Cholesterol 157   < > 179   Triglycerides 90   < > 158 H   LDL Cholesterol 88.0   < > 94.4   HDL/Cholesterol Ratio 32.5   < > 29.6   Non-HDL Cholesterol 106   < > 126   Total Cholesterol/HDL Ratio 3.1   < > 3.4    < > = values in this interval not displayed.     TSH:  Recent " Labs   Lab 12/11/20  0959   TSH 0.682         ASSESSMENT & PLAN:    Problem List Items Addressed This Visit          Psychiatric    Major depressive disorder, single episode, mild (Chronic)    Overview     Currently asymptomatic without medication            Cardiac/Vascular    Hypertension - Primary (Chronic)    Overview     Well controlled. Continue current regimen           Relevant Medications    hydroCHLOROthiazide (HYDRODIURIL) 25 MG tablet    metoprolol succinate (TOPROL-XL) 50 MG 24 hr tablet    Other Relevant Orders    CBC Auto Differential    Comprehensive Metabolic Panel    Lipid Panel    Mixed hyperlipidemia (Chronic)    Overview     Continue statin         Relevant Medications    atorvastatin (LIPITOR) 10 MG tablet       Endocrine    Morbid obesity with body mass index of 40.0-44.9 in adult (Chronic)    Overview     Body mass index is 40.61 kg/m².    The patient is asked to make an attempt to improve diet and exercise patterns to aid in medical management of this problem.         Prediabetes (Chronic)    Overview     Last A1c is 6.1  The patient is asked to make an attempt to improve diet and exercise patterns to aid in medical management of this problem.           Relevant Orders    Hemoglobin A1C (Completed)    Hemoglobin A1C     Other Visit Diagnoses       Breast cancer screening by mammogram        Relevant Orders    Mammo Digital Screening Bilat w/ Nba              Follow up in about 6 months (around 5/8/2024) for blood pressure, preDM.      RTC/ED precautions discussed where applicable.   Encouraged patient to let me know if there are any further questions/concerns.     Advise patient/caretaker to check with insurance regarding orders to avoid unexpected fees/costs.     The patient/caretaker indicates understanding of these issues and agrees with the plan.    Dr. Gay Langford MD  Family Medicine

## 2023-11-09 PROBLEM — R73.03 PREDIABETES: Chronic | Status: ACTIVE | Noted: 2023-05-04

## 2024-02-19 ENCOUNTER — TELEPHONE (OUTPATIENT)
Dept: ADMINISTRATIVE | Facility: CLINIC | Age: 74
End: 2024-02-19
Payer: MEDICARE

## 2024-02-20 ENCOUNTER — OFFICE VISIT (OUTPATIENT)
Dept: FAMILY MEDICINE | Facility: CLINIC | Age: 74
End: 2024-02-20
Payer: MEDICARE

## 2024-02-20 VITALS
HEART RATE: 80 BPM | HEIGHT: 65 IN | DIASTOLIC BLOOD PRESSURE: 60 MMHG | SYSTOLIC BLOOD PRESSURE: 130 MMHG | OXYGEN SATURATION: 98 % | BODY MASS INDEX: 41.89 KG/M2 | WEIGHT: 251.44 LBS

## 2024-02-20 DIAGNOSIS — M25.561 CHRONIC PAIN OF RIGHT KNEE: ICD-10-CM

## 2024-02-20 DIAGNOSIS — F32.0 MAJOR DEPRESSIVE DISORDER, SINGLE EPISODE, MILD: Chronic | ICD-10-CM

## 2024-02-20 DIAGNOSIS — R39.15 URINARY URGENCY: Chronic | ICD-10-CM

## 2024-02-20 DIAGNOSIS — G89.29 CHRONIC PAIN OF RIGHT KNEE: ICD-10-CM

## 2024-02-20 DIAGNOSIS — R73.03 PREDIABETES: Chronic | ICD-10-CM

## 2024-02-20 DIAGNOSIS — Z00.00 ENCOUNTER FOR PREVENTIVE HEALTH EXAMINATION: Primary | ICD-10-CM

## 2024-02-20 DIAGNOSIS — Z11.59 ENCOUNTER FOR HEPATITIS C SCREENING TEST FOR LOW RISK PATIENT: ICD-10-CM

## 2024-02-20 DIAGNOSIS — R00.2 PALPITATIONS: ICD-10-CM

## 2024-02-20 DIAGNOSIS — E66.01 MORBID OBESITY WITH BODY MASS INDEX OF 40.0-44.9 IN ADULT: Chronic | ICD-10-CM

## 2024-02-20 DIAGNOSIS — F41.9 ANXIETY: ICD-10-CM

## 2024-02-20 DIAGNOSIS — E78.2 MIXED HYPERLIPIDEMIA: Chronic | ICD-10-CM

## 2024-02-20 DIAGNOSIS — I10 PRIMARY HYPERTENSION: Chronic | ICD-10-CM

## 2024-02-20 PROCEDURE — 1159F MED LIST DOCD IN RCRD: CPT | Mod: HCNC,CPTII,S$GLB,

## 2024-02-20 PROCEDURE — 3075F SYST BP GE 130 - 139MM HG: CPT | Mod: HCNC,CPTII,S$GLB,

## 2024-02-20 PROCEDURE — G0439 PPPS, SUBSEQ VISIT: HCPCS | Mod: HCNC,S$GLB,,

## 2024-02-20 PROCEDURE — 1126F AMNT PAIN NOTED NONE PRSNT: CPT | Mod: HCNC,CPTII,S$GLB,

## 2024-02-20 PROCEDURE — 1170F FXNL STATUS ASSESSED: CPT | Mod: HCNC,CPTII,S$GLB,

## 2024-02-20 PROCEDURE — 3078F DIAST BP <80 MM HG: CPT | Mod: HCNC,CPTII,S$GLB,

## 2024-02-20 PROCEDURE — 1101F PT FALLS ASSESS-DOCD LE1/YR: CPT | Mod: HCNC,CPTII,S$GLB,

## 2024-02-20 PROCEDURE — 1160F RVW MEDS BY RX/DR IN RCRD: CPT | Mod: HCNC,CPTII,S$GLB,

## 2024-02-20 PROCEDURE — 99999 PR PBB SHADOW E&M-EST. PATIENT-LVL IV: CPT | Mod: PBBFAC,HCNC,,

## 2024-02-20 PROCEDURE — 3288F FALL RISK ASSESSMENT DOCD: CPT | Mod: HCNC,CPTII,S$GLB,

## 2024-02-20 NOTE — PROGRESS NOTES
"Ludmila Jimenez presented for a  Medicare AWV and comprehensive Health Risk Assessment today. The following components were reviewed and updated:    Medical history  Family History  Social history  Allergies and Current Medications  Health Risk Assessment  Health Maintenance  Care Team         ** See Completed Assessments for Annual Wellness Visit within the encounter summary.**         The following assessments were completed:  Living Situation  CAGE  Depression Screening  Timed Get Up and Go  Whisper Test  Cognitive Function Screening    Nutrition Screening  ADL Screening  PAQ Screening        Vitals:    02/20/24 1324   BP: 130/60   BP Location: Left arm   Patient Position: Sitting   BP Method: Large (Manual)   Pulse: 80   SpO2: 98%   Weight: 114 kg (251 lb 7 oz)   Height: 5' 5" (1.651 m)     Body mass index is 41.84 kg/m².  Physical Exam  Vitals reviewed.   Constitutional:       Appearance: Normal appearance. She is well-developed and well-groomed.   Eyes:      Comments: Wearing glasses    Cardiovascular:      Rate and Rhythm: Normal rate and regular rhythm.   Pulmonary:      Effort: Pulmonary effort is normal. No respiratory distress.      Breath sounds: No wheezing, rhonchi or rales.   Skin:     Coloration: Skin is not pale.   Neurological:      General: No focal deficit present.      Mental Status: She is alert and oriented to person, place, and time.   Psychiatric:         Attention and Perception: Attention normal.         Mood and Affect: Mood normal.         Speech: Speech normal.         Behavior: Behavior normal. Behavior is cooperative.         Thought Content: Thought content normal.         Judgment: Judgment normal.               Diagnoses and health risks identified today and associated recommendations/orders:    1. Encounter for preventive health examination  Screenings performed, as noted above. Personal preventative testing needs reviewed.     2. Morbid obesity with body mass index of 40.0-44.9 " in adult  Work on diet modification and increase in physical activity as tolerated.   Followed by PCP.     3. Major depressive disorder, single episode, mild  4. Anxiety  Chronic. Stable. Followed by PCP.     5. Primary hypertension  6. Mixed hyperlipidemia  7. Palpitations  Chronic; stable on medication. Followed by PCP.    8. Urinary urgency  Chronic. Stable. Followed by PCP.     9. Prediabetes  Encouraged patient to work on diet modification and increase in physical activity as tolerated.   Followed by PCP.     10. Chronic pain of right knee  Chronic. Stable. Followed by PCP.     11. Encounter for hepatitis C screening test for low risk patient  - Hepatitis C Antibody; Future      Review for Opioid Screening: Patient does not have rx for Opioids.    Review for Substance Use Disorders: Patient does not use substance.    Provided Ludmila with a 5-10 year written screening schedule and personal prevention plan. Recommendations were developed using the USPSTF age appropriate recommendations. Education, counseling, and referrals were provided as needed. After Visit Summary printed and given to patient which includes a list of additional screenings\tests needed.    Follow up in about 1 year (around 2/20/2025) for your next annual wellness visit.    Ana Hart NP      Advance Care Planning     I offered to discuss advanced care planning, including how to pick a person who would make decisions for you if you were unable to make them for yourself, called a health care power of , and what kind of decisions you might make such as use of life sustaining treatments such as ventilators and tube feeding when faced with a life limiting illness recorded on a living will that they will need to know. (How you want to be cared for as you near the end of your natural life)     X Patient is interested in learning more about how to make advanced directives.  I provided them paperwork and offered to discuss this with them.

## 2024-02-20 NOTE — PATIENT INSTRUCTIONS
Counseling and Referral of Other Preventative  (Italic type indicates deductible and co-insurance are waived)    Patient Name: Ludmila Jimenez  Today's Date: 2/20/2024    Health Maintenance       Date Due Completion Date    Hepatitis C Screening Never done ---    RSV Vaccine (Age 60+ and Pregnant patients) (1 - 1-dose 60+ series) 03/31/2024 (Originally 1/20/2009) ---    COVID-19 Vaccine (4 - 2023-24 season) 02/20/2025 (Originally 9/1/2023) 12/3/2021    DEXA Scan 10/28/2024 10/28/2021    Hemoglobin A1c (Prediabetes) 11/08/2024 11/8/2023    Lipid Panel 05/03/2028 5/3/2023    Colorectal Cancer Screening 02/27/2030 2/27/2020    TETANUS VACCINE 01/03/2033 1/3/2023        Orders Placed This Encounter   Procedures    Hepatitis C Antibody     The following information is provided to all patients.  This information is to help you find resources for any of the problems found today that may be affecting your health:                  Living healthy guide: www.[facility].[place].gov      Understanding Diabetes: www.diabetes.org      Eating healthy: www.cdc.gov/healthyweight      CDC home safety checklist: www.cdc.gov/steadi/patient.html      Agency on Aging: www.goea.[place].gov      Alcoholics anonymous (AA): www.aa.org      Physical Activity: www.bhavin.nih.gov/vh4jzgh      Tobacco use: www.quitwithusla.org

## 2024-05-09 ENCOUNTER — OFFICE VISIT (OUTPATIENT)
Dept: FAMILY MEDICINE | Facility: CLINIC | Age: 74
End: 2024-05-09
Payer: MEDICARE

## 2024-05-09 VITALS
HEIGHT: 65 IN | HEART RATE: 75 BPM | OXYGEN SATURATION: 98 % | DIASTOLIC BLOOD PRESSURE: 80 MMHG | BODY MASS INDEX: 40.96 KG/M2 | WEIGHT: 245.81 LBS | SYSTOLIC BLOOD PRESSURE: 136 MMHG

## 2024-05-09 DIAGNOSIS — I10 PRIMARY HYPERTENSION: Chronic | ICD-10-CM

## 2024-05-09 DIAGNOSIS — E78.2 MIXED HYPERLIPIDEMIA: ICD-10-CM

## 2024-05-09 DIAGNOSIS — E66.01 MORBID OBESITY WITH BODY MASS INDEX OF 40.0-44.9 IN ADULT: Chronic | ICD-10-CM

## 2024-05-09 DIAGNOSIS — R73.03 PREDIABETES: Primary | Chronic | ICD-10-CM

## 2024-05-09 DIAGNOSIS — F32.0 MAJOR DEPRESSIVE DISORDER, SINGLE EPISODE, MILD: Chronic | ICD-10-CM

## 2024-05-09 PROCEDURE — 1101F PT FALLS ASSESS-DOCD LE1/YR: CPT | Mod: HCNC,CPTII,S$GLB, | Performed by: FAMILY MEDICINE

## 2024-05-09 PROCEDURE — 3288F FALL RISK ASSESSMENT DOCD: CPT | Mod: HCNC,CPTII,S$GLB, | Performed by: FAMILY MEDICINE

## 2024-05-09 PROCEDURE — 1159F MED LIST DOCD IN RCRD: CPT | Mod: HCNC,CPTII,S$GLB, | Performed by: FAMILY MEDICINE

## 2024-05-09 PROCEDURE — 99999 PR PBB SHADOW E&M-EST. PATIENT-LVL III: CPT | Mod: PBBFAC,HCNC,, | Performed by: FAMILY MEDICINE

## 2024-05-09 PROCEDURE — 99214 OFFICE O/P EST MOD 30 MIN: CPT | Mod: HCNC,S$GLB,, | Performed by: FAMILY MEDICINE

## 2024-05-09 PROCEDURE — 3075F SYST BP GE 130 - 139MM HG: CPT | Mod: HCNC,CPTII,S$GLB, | Performed by: FAMILY MEDICINE

## 2024-05-09 PROCEDURE — 1160F RVW MEDS BY RX/DR IN RCRD: CPT | Mod: HCNC,CPTII,S$GLB, | Performed by: FAMILY MEDICINE

## 2024-05-09 PROCEDURE — 3079F DIAST BP 80-89 MM HG: CPT | Mod: HCNC,CPTII,S$GLB, | Performed by: FAMILY MEDICINE

## 2024-05-09 PROCEDURE — 1126F AMNT PAIN NOTED NONE PRSNT: CPT | Mod: HCNC,CPTII,S$GLB, | Performed by: FAMILY MEDICINE

## 2024-05-09 RX ORDER — ATORVASTATIN CALCIUM 10 MG/1
10 TABLET, FILM COATED ORAL DAILY
Qty: 90 TABLET | Refills: 3 | Status: SHIPPED | OUTPATIENT
Start: 2024-05-09

## 2024-05-09 RX ORDER — METOPROLOL SUCCINATE 50 MG/1
50 TABLET, EXTENDED RELEASE ORAL DAILY
Qty: 90 TABLET | Refills: 3 | Status: SHIPPED | OUTPATIENT
Start: 2024-05-09

## 2024-05-09 RX ORDER — HYDROCHLOROTHIAZIDE 25 MG/1
25 TABLET ORAL DAILY
Qty: 90 TABLET | Refills: 3 | Status: SHIPPED | OUTPATIENT
Start: 2024-05-09

## 2024-05-09 NOTE — PROGRESS NOTES
PATIENT VISIT FAMILY MEDICINE    CC:   Chief Complaint   Patient presents with    Follow-up    Diabetes    Hypertension       HPI: Ludmila Jimenez  is a 74 y.o. female:    Patient is known to me.  Patient presents routine follow up on chronic conditions    Patient doing well overall, taking medications as prescribed and with no acute concerns.         PMHX:   Past Medical History:   Diagnosis Date    High cholesterol     Hypertension        PSHX:   Past Surgical History:   Procedure Laterality Date    COLONOSCOPY  02/27/2020    One 4 mm polyp in the ascending colon.Repeat colonoscopy  in 7-10 years.    HYSTERECTOMY         FAMHX:   Family History   Problem Relation Name Age of Onset    Hypertension Sister x8     No Known Problems Brother x3     Hypertension Daughter      No Known Problems Daughter      No Known Problems Son      No Known Problems Son      No Known Problems Son      No Known Problems Son         SOCHX:   Social History     Socioeconomic History    Marital status:    Tobacco Use    Smoking status: Never     Passive exposure: Never    Smokeless tobacco: Never   Substance and Sexual Activity    Alcohol use: Not Currently     Alcohol/week: 1.0 standard drink of alcohol     Types: 1 Glasses of wine per week     Comment: very seldom     Drug use: No    Sexual activity: Not Currently     Partners: Male     Social Determinants of Health     Financial Resource Strain: Low Risk  (2/20/2024)    Overall Financial Resource Strain (CARDIA)     Difficulty of Paying Living Expenses: Not hard at all   Food Insecurity: No Food Insecurity (2/20/2024)    Hunger Vital Sign     Worried About Running Out of Food in the Last Year: Never true     Ran Out of Food in the Last Year: Never true   Transportation Needs: No Transportation Needs (2/20/2024)    PRAPARE - Transportation     Lack of Transportation (Medical): No     Lack of Transportation (Non-Medical): No   Physical Activity: Unknown (2/20/2024)    Exercise Vital  "Sign     Minutes of Exercise per Session: 60 min   Stress: No Stress Concern Present (2/20/2024)    [employer] - Occupational Stress Questionnaire     Feeling of Stress : Not at all   Housing Stability: Low Risk  (2/20/2024)    Housing Stability Vital Sign     Unable to Pay for Housing in the Last Year: No     Number of Places Lived in the Last Year: 1     Unstable Housing in the Last Year: No       ALLERGIES: Review of patient's allergies indicates:  No Known Allergies    MEDS:   Current Outpatient Medications:     aspirin (ECOTRIN) 81 MG EC tablet, Take 81 mg by mouth once daily., Disp: , Rfl:     multivit-min/iron/folic/lutein (CENTRUM SILVER WOMEN ORAL), Take 1 tablet by mouth Daily., Disp: , Rfl:     atorvastatin (LIPITOR) 10 MG tablet, Take 1 tablet (10 mg total) by mouth once daily., Disp: 90 tablet, Rfl: 3    hydroCHLOROthiazide (HYDRODIURIL) 25 MG tablet, Take 1 tablet (25 mg total) by mouth once daily., Disp: 90 tablet, Rfl: 3    metoprolol succinate (TOPROL-XL) 50 MG 24 hr tablet, Take 1 tablet (50 mg total) by mouth once daily., Disp: 90 tablet, Rfl: 3    OBJECTIVE:   Vitals:    05/09/24 0922   BP: 136/80   BP Location: Left arm   Patient Position: Sitting   BP Method: Large (Manual)   Pulse: 75   SpO2: 98%   Weight: 111.5 kg (245 lb 13 oz)   Height: 5' 5" (1.651 m)     Body mass index is 40.91 kg/m².      Physical Exam  Vitals and nursing note reviewed.   Constitutional:       Appearance: Normal appearance.   HENT:      Head: Normocephalic.   Eyes:      General:         Right eye: No discharge.         Left eye: No discharge.      Extraocular Movements: Extraocular movements intact.   Cardiovascular:      Rate and Rhythm: Normal rate and regular rhythm.      Heart sounds: Normal heart sounds.   Pulmonary:      Effort: Pulmonary effort is normal.      Breath sounds: Normal breath sounds.   Abdominal:      General: Abdomen is flat. Bowel sounds are normal. There is no " distension.      Palpations: Abdomen is soft. There is no mass.      Tenderness: There is no abdominal tenderness. There is no guarding or rebound.      Hernia: No hernia is present.   Skin:     Comments: No obvious rash on exposed skin   Neurological:      Mental Status: She is alert.   Psychiatric:         Behavior: Behavior normal.           LABS:   A1C:  Recent Labs   Lab 11/08/23  1044   Hemoglobin A1C 6.1 H     CBC:  Recent Labs   Lab 05/09/24  1030   WBC 5.03   RBC 4.52   Hemoglobin 12.2   Hematocrit 39.4   Platelets 309   MCV 87   MCH 27.0   MCHC 31.0 L     CMP:  Recent Labs   Lab 05/09/24  1030   Glucose 93   Calcium 9.7   Albumin 3.5   Total Protein 7.4   Sodium 138   Potassium 4.9   CO2 28   Chloride 102   BUN 15   Creatinine 0.8   Alkaline Phosphatase 89   ALT 13   AST 15   Total Bilirubin 0.2     LIPIDS:  Recent Labs   Lab 05/09/24  1030   HDL 58   Cholesterol 175   Triglycerides 133   LDL Cholesterol 90.4   HDL/Cholesterol Ratio 33.1   Non-HDL Cholesterol 117   Total Cholesterol/HDL Ratio 3.0     TSH:          ASSESSMENT & PLAN:    Problem List Items Addressed This Visit          Psychiatric    Major depressive disorder, single episode, mild (Chronic)    Overview     Currently asymptomatic without medication            Cardiac/Vascular    Hypertension (Chronic)    Overview     Well controlled. Continue current regimen           Relevant Medications    hydroCHLOROthiazide (HYDRODIURIL) 25 MG tablet    metoprolol succinate (TOPROL-XL) 50 MG 24 hr tablet    Other Relevant Orders    CBC Auto Differential (Completed)    Comprehensive Metabolic Panel (Completed)    Mixed hyperlipidemia (Chronic)    Overview     Stable. Continue statin         Relevant Medications    atorvastatin (LIPITOR) 10 MG tablet    Other Relevant Orders    CBC Auto Differential (Completed)    Comprehensive Metabolic Panel (Completed)    Lipid Panel (Completed)       Endocrine    Morbid obesity with body mass index of 40.0-44.9 in adult  (Chronic)    Overview     Body mass index is 40.91 kg/m².    The patient is asked to make an attempt to improve diet and exercise patterns to aid in medical management of this problem.         Prediabetes - Primary (Chronic)    Overview     Last A1c is 6.1 on 11/08/2023    The patient is asked to make an attempt to improve diet and exercise patterns to aid in medical management of this problem.           Relevant Orders    CBC Auto Differential (Completed)    Hemoglobin A1C         Follow up in about 6 months (around 11/9/2024) for blood pressure, diabetes.      RTC/ED precautions discussed where applicable.   Encouraged patient to let me know if there are any further questions/concerns.     Advise patient/caretaker to check with insurance regarding orders to avoid unexpected fees/costs.     The patient/caretaker indicates understanding of these issues and agrees with the plan.    Dr. Gay Langford MD  Family Medicine

## 2024-05-09 NOTE — PATIENT INSTRUCTIONS
Limit fluid intake before bedtime. Only take sips of water within 2 hours of bedtime if you need to take medicine.

## 2024-06-13 DIAGNOSIS — M25.561 ACUTE PAIN OF RIGHT KNEE: Primary | ICD-10-CM

## 2024-06-18 ENCOUNTER — OFFICE VISIT (OUTPATIENT)
Dept: ORTHOPEDICS | Facility: CLINIC | Age: 74
End: 2024-06-18
Payer: MEDICARE

## 2024-06-18 VITALS — BODY MASS INDEX: 40.77 KG/M2 | WEIGHT: 244.69 LBS | HEIGHT: 65 IN

## 2024-06-18 DIAGNOSIS — M17.11 PRIMARY OSTEOARTHRITIS OF RIGHT KNEE: Primary | ICD-10-CM

## 2024-06-18 PROCEDURE — 1101F PT FALLS ASSESS-DOCD LE1/YR: CPT | Mod: HCNC,CPTII,S$GLB, | Performed by: ORTHOPAEDIC SURGERY

## 2024-06-18 PROCEDURE — 1126F AMNT PAIN NOTED NONE PRSNT: CPT | Mod: HCNC,CPTII,S$GLB, | Performed by: ORTHOPAEDIC SURGERY

## 2024-06-18 PROCEDURE — 99202 OFFICE O/P NEW SF 15 MIN: CPT | Mod: HCNC,S$GLB,, | Performed by: ORTHOPAEDIC SURGERY

## 2024-06-18 PROCEDURE — 3044F HG A1C LEVEL LT 7.0%: CPT | Mod: HCNC,CPTII,S$GLB, | Performed by: ORTHOPAEDIC SURGERY

## 2024-06-18 PROCEDURE — 99999 PR PBB SHADOW E&M-EST. PATIENT-LVL III: CPT | Mod: PBBFAC,HCNC,, | Performed by: ORTHOPAEDIC SURGERY

## 2024-06-18 PROCEDURE — 1159F MED LIST DOCD IN RCRD: CPT | Mod: HCNC,CPTII,S$GLB, | Performed by: ORTHOPAEDIC SURGERY

## 2024-06-18 PROCEDURE — 3288F FALL RISK ASSESSMENT DOCD: CPT | Mod: HCNC,CPTII,S$GLB, | Performed by: ORTHOPAEDIC SURGERY

## 2024-06-18 PROCEDURE — 1160F RVW MEDS BY RX/DR IN RCRD: CPT | Mod: HCNC,CPTII,S$GLB, | Performed by: ORTHOPAEDIC SURGERY

## 2024-06-18 RX ORDER — NAPROXEN 500 MG/1
500 TABLET ORAL 2 TIMES DAILY WITH MEALS
Qty: 60 TABLET | Refills: 2 | Status: SHIPPED | OUTPATIENT
Start: 2024-06-18

## 2024-06-18 NOTE — PROGRESS NOTES
Subjective:      Patient ID: Ludmila Jimenez is a 74 y.o. female.    Chief Complaint: Pain of the Right Knee    HPI     They have experienced problems with their right knee over the past intermittently for several years. The patient denies relevant history of injury/aggravation. Pain is located diffusely Associated symptoms include swelling.  Symptoms are aggravated by yd work. They have been treated with occasional Tylenol.   Symptoms have recently stayed the same. Ambulation reportedly has not been impaired. Self care ADLs are not painful.     Review of Systems   Constitutional: Negative for fever and weight loss.   HENT:  Negative for congestion.    Eyes:  Negative for visual disturbance.   Cardiovascular:  Negative for chest pain.   Respiratory:  Negative for shortness of breath.    Hematologic/Lymphatic: Negative for bleeding problem. Does not bruise/bleed easily.   Skin:  Negative for poor wound healing.   Musculoskeletal:  Positive for joint pain and joint swelling.   Gastrointestinal:  Negative for abdominal pain.   Genitourinary:  Negative for dysuria.   Neurological:  Negative for seizures.   Psychiatric/Behavioral:  Negative for altered mental status.    Allergic/Immunologic: Negative for persistent infections.         Objective:      Ortho/SPM Exam      Right knee      [unfilled]    The patient is not in acute distress.   Sclerae normal  Body habitus is obese.  Respiratory distress:  none   The patient walks without a limp.  Hip irritability  negative.   The skin over the knee is intact.  Knee effusion 1+  Palpation- nontender  Range of motion- 0-120  deg,   Ligament laxity exam:  2+ valgus laxity  Popliteal cyst negative  Patellar crepitation absent.  Flexion/pinch negative  Pulses DP present, PT present.  Motor normal 5/5 strength in all tested muscle groups.   Sensory normal.      IMAGING- right knee radiographs show complete loss of medial joint space with sclerosis and osteophytes, Kellgren stage  III        Assessment:       Encounter Diagnosis   Name Primary?    Primary osteoarthritis of right knee Yes            Although the patient has advanced radiographic findings, she does not have severe pain or functional impairment.  Progressive symptoms may develop in the future      Plan:       Ludmila was seen today for pain.    Diagnoses and all orders for this visit:    Primary osteoarthritis of right knee            I explained my diagnostic impression and the reasoning behind it in detail, using layman's terms.  Models and/or pictures were used to help in the explanation.    Naprosyn    I explained to the patient that I am providing a prescription for anti-inflammatory medication.  I warned the patient that it should not be taken with other anti-inflammatory medications including over-the-counter products containing ibuprofen and naproxen.  I advised them to consult their primary physician or pharmacist if there is any question about this in the future.  I discussed the possible side effects including cardiovascular issues, renal issues and gastrointestinal problems.  I advised the patient to discontinue the medication should they develop persistent symptoms of dyspepsia.    I also explained that should they elect to continue this medication long-term, that is beyond the prescription that I provide at this time, they should contact their primary physician for refills and appropriate monitoring.    Ice after vigorous activity is recommended    Palliative injection can be considered in the future    I explained the potential role of surgery in the treatment of this condition to the patient.  They understand that if nonsurgical measures do not adequately control symptoms, surgery will be considered in the future.

## 2024-07-16 ENCOUNTER — OFFICE VISIT (OUTPATIENT)
Dept: ORTHOPEDICS | Facility: CLINIC | Age: 74
End: 2024-07-16
Payer: MEDICARE

## 2024-07-16 DIAGNOSIS — M17.11 PRIMARY OSTEOARTHRITIS OF RIGHT KNEE: Primary | ICD-10-CM

## 2024-07-16 PROCEDURE — 3044F HG A1C LEVEL LT 7.0%: CPT | Mod: HCNC,CPTII,S$GLB, | Performed by: ORTHOPAEDIC SURGERY

## 2024-07-16 PROCEDURE — 99999 PR PBB SHADOW E&M-EST. PATIENT-LVL II: CPT | Mod: PBBFAC,HCNC,, | Performed by: ORTHOPAEDIC SURGERY

## 2024-07-16 PROCEDURE — 1160F RVW MEDS BY RX/DR IN RCRD: CPT | Mod: HCNC,CPTII,S$GLB, | Performed by: ORTHOPAEDIC SURGERY

## 2024-07-16 PROCEDURE — 99212 OFFICE O/P EST SF 10 MIN: CPT | Mod: HCNC,S$GLB,, | Performed by: ORTHOPAEDIC SURGERY

## 2024-07-16 PROCEDURE — 3288F FALL RISK ASSESSMENT DOCD: CPT | Mod: HCNC,CPTII,S$GLB, | Performed by: ORTHOPAEDIC SURGERY

## 2024-07-16 PROCEDURE — 1101F PT FALLS ASSESS-DOCD LE1/YR: CPT | Mod: HCNC,CPTII,S$GLB, | Performed by: ORTHOPAEDIC SURGERY

## 2024-07-16 PROCEDURE — 1159F MED LIST DOCD IN RCRD: CPT | Mod: HCNC,CPTII,S$GLB, | Performed by: ORTHOPAEDIC SURGERY

## 2024-07-16 NOTE — PROGRESS NOTES
Subjective:      Patient ID: Ludmila Jimenez is a 74 y.o. female.    Chief Complaint: Follow-up (1 month follow up- right knee )    HPI    Follow-up for osteoarthritis.  The patient was treated with Naprosyn and feels much better.  She denies any significant pain or functional limitation at this time.          Review of Systems   Constitutional: Negative for fever and weight loss.   HENT:  Negative for congestion.    Eyes:  Negative for visual disturbance.   Cardiovascular:  Negative for chest pain.   Respiratory:  Negative for shortness of breath.    Hematologic/Lymphatic: Negative for bleeding problem. Does not bruise/bleed easily.   Skin:  Negative for poor wound healing.   Gastrointestinal:  Negative for abdominal pain.   Genitourinary:  Negative for dysuria.   Neurological:  Negative for seizures.   Psychiatric/Behavioral:  Negative for altered mental status.    Allergic/Immunologic: Negative for persistent infections.         Objective:      Ortho/SPM Exam    Right knee  The patient is not in acute distress.   Sclerae normal  Body habitus is obese.  Respiratory distress:  none   The patient walks without a limp.  Hip irritability  negative.   The skin over the knee is intact.  Knee effusion Trace  Palpation- nontender  Range of motion- 0-120  deg,   Ligament laxity exam:  2+ valgus laxity  Popliteal cyst present  Patellar crepitation absent.  Flexion/pinch negative  Pulses DP present, PT present.  Motor normal 5/5 strength in all tested muscle groups.   Sensory normal.              Assessment:       Encounter Diagnosis   Name Primary?    Primary osteoarthritis of right knee Yes        Although the condition is structurally advanced, the patient does not have significant pain or impairment at this time.  The resolution of symptoms over the last few weeks is suggestive of a resolve bone contusion.  The condition may prove to be structurally progressive in the long run and require consideration of surgical  intervention.          Plan:       Ludmila was seen today for follow-up.    Diagnoses and all orders for this visit:    Primary osteoarthritis of right knee          I explained my diagnostic impression and the reasoning behind it in detail, using layman's terms.      Intermittent use of Naprosyn explained with usual instructions and precautions

## 2024-08-08 RX ORDER — DEXTROSE 4 G
TABLET,CHEWABLE ORAL
Refills: 0 | OUTPATIENT
Start: 2024-08-08

## 2024-08-08 RX ORDER — ISOPROPYL ALCOHOL 70 ML/100ML
SWAB TOPICAL
Refills: 0 | OUTPATIENT
Start: 2024-08-08

## 2024-08-31 DIAGNOSIS — M17.11 PRIMARY OSTEOARTHRITIS OF RIGHT KNEE: ICD-10-CM

## 2024-08-31 NOTE — TELEPHONE ENCOUNTER
Good Arturo Muñiz,     Please see the attached refill request. Patient's last office visit was 7/17/2024. Please advise.     Thanks!

## 2024-09-03 RX ORDER — NAPROXEN 500 MG/1
500 TABLET ORAL 2 TIMES DAILY WITH MEALS
Qty: 180 TABLET | Refills: 3 | Status: SHIPPED | OUTPATIENT
Start: 2024-09-03

## 2024-09-13 ENCOUNTER — TELEPHONE (OUTPATIENT)
Facility: CLINIC | Age: 74
End: 2024-09-13
Payer: MEDICARE

## 2024-09-13 NOTE — TELEPHONE ENCOUNTER
----- Message from Kaylin Calabrese sent at 9/13/2024  2:50 PM CDT -----  Regarding: Patient advice  Contact: [phone]            Name of Caller: Ludmila Evans Preference: [phone]    Nature of Call: would like to know when was her last Flu vaccine received

## 2024-09-13 NOTE — TELEPHONE ENCOUNTER
----- Message from Kaylin Calabrese sent at 9/13/2024  2:50 PM CDT -----  Regarding: Patient advice  Contact: [phone]            Name of Caller: Ludmila Evans Preference: [phone]    Nature of Call: would like to know when was her last Flu vaccine received

## 2024-10-01 ENCOUNTER — OFFICE VISIT (OUTPATIENT)
Dept: ORTHOPEDICS | Facility: CLINIC | Age: 74
End: 2024-10-01
Payer: MEDICARE

## 2024-10-01 DIAGNOSIS — M65.962 SYNOVITIS OF LEFT KNEE: Primary | ICD-10-CM

## 2024-10-01 PROCEDURE — 99999 PR PBB SHADOW E&M-EST. PATIENT-LVL II: CPT | Mod: PBBFAC,HCNC,, | Performed by: ORTHOPAEDIC SURGERY

## 2024-10-01 PROCEDURE — 3044F HG A1C LEVEL LT 7.0%: CPT | Mod: HCNC,CPTII,S$GLB, | Performed by: ORTHOPAEDIC SURGERY

## 2024-10-01 PROCEDURE — 99213 OFFICE O/P EST LOW 20 MIN: CPT | Mod: HCNC,25,S$GLB, | Performed by: ORTHOPAEDIC SURGERY

## 2024-10-01 PROCEDURE — 1160F RVW MEDS BY RX/DR IN RCRD: CPT | Mod: HCNC,CPTII,S$GLB, | Performed by: ORTHOPAEDIC SURGERY

## 2024-10-01 PROCEDURE — 3288F FALL RISK ASSESSMENT DOCD: CPT | Mod: HCNC,CPTII,S$GLB, | Performed by: ORTHOPAEDIC SURGERY

## 2024-10-01 PROCEDURE — 1159F MED LIST DOCD IN RCRD: CPT | Mod: HCNC,CPTII,S$GLB, | Performed by: ORTHOPAEDIC SURGERY

## 2024-10-01 PROCEDURE — 1125F AMNT PAIN NOTED PAIN PRSNT: CPT | Mod: HCNC,CPTII,S$GLB, | Performed by: ORTHOPAEDIC SURGERY

## 2024-10-01 PROCEDURE — 20610 DRAIN/INJ JOINT/BURSA W/O US: CPT | Mod: HCNC,LT,S$GLB, | Performed by: ORTHOPAEDIC SURGERY

## 2024-10-01 PROCEDURE — 1101F PT FALLS ASSESS-DOCD LE1/YR: CPT | Mod: HCNC,CPTII,S$GLB, | Performed by: ORTHOPAEDIC SURGERY

## 2024-10-01 RX ORDER — TRIAMCINOLONE ACETONIDE 40 MG/ML
40 INJECTION, SUSPENSION INTRA-ARTICULAR; INTRAMUSCULAR
Status: DISCONTINUED | OUTPATIENT
Start: 2024-10-01 | End: 2024-10-01 | Stop reason: HOSPADM

## 2024-10-01 RX ADMIN — TRIAMCINOLONE ACETONIDE 40 MG: 40 INJECTION, SUSPENSION INTRA-ARTICULAR; INTRAMUSCULAR at 02:10

## 2024-10-01 NOTE — PROCEDURES
Large Joint Aspiration/Injection: L knee    Date/Time: 10/1/2024 2:00 PM    Performed by: Eddie Mace MD  Authorized by: Eddie Mace MD    Location:  Knee  Site:  L knee  Medications:  40 mg triamcinolone acetonide 40 mg/mL      After explaining the procedure, the patient gave verbal consent for left knee aspiration. The sight was identified and the skin was aseptically prepped.  Forty cc's of clear fluid was obtained.     After injection the joint was injected with 40 mg of triamcinolone and 1 cc of 1% plain Xylocaine.  A sterile dressing was applied.  The patient was instructed to call if there were any problems at the aspiration sight.   no

## 2024-10-01 NOTE — PROGRESS NOTES
Subjective:      Patient ID: Ludmila Jimenez is a 74 y.o. female.    Chief Complaint: Follow-up (L knee )    HPI    Follow-up for osteoarthritis.  The patient complains of 1 week increased pain and swelling.  She uses Naprosyn and a cane which is not controlling symptoms.  She denies any acute injury or systemic symptoms.          Review of Systems   Constitutional: Negative for fever and weight loss.   HENT:  Negative for congestion.    Eyes:  Negative for visual disturbance.   Cardiovascular:  Negative for chest pain.   Respiratory:  Negative for shortness of breath.    Hematologic/Lymphatic: Negative for bleeding problem. Does not bruise/bleed easily.   Skin:  Negative for poor wound healing.   Musculoskeletal:  Positive for joint pain and joint swelling.   Gastrointestinal:  Negative for abdominal pain.   Genitourinary:  Negative for dysuria.   Neurological:  Negative for seizures.   Psychiatric/Behavioral:  Negative for altered mental status.    Allergic/Immunologic: Negative for persistent infections.         Objective:      Ortho/SPM Exam      Left knee    [unfilled]    The patient is not in acute distress.   Sclerae normal  Body habitus is normal.  Respiratory distress:  none   The patient walks with a limp.  Hip irritability  negative.   The skin over the knee is intact.  Knee effusion 3+  Palpation- no direct tenderness  Range of motion- 5-120  deg,   Ligament laxity exam:  1+ valgus laxity  Popliteal cyst negative  Patellar crepitation absent.  Flexion/pinch negative  Pulses DP present, PT present.  Motor normal 5/5 strength in all tested muscle groups.   Sensory normal.    Previous left knee radiographs show mild to moderate medial narrowing          Assessment:       Encounter Diagnosis   Name Primary?    Synovitis of left knee Yes    This probably represents an acute flare of chronic mild to moderate osteoarthritis              Plan:       Ludmila was seen today for follow-up.    Diagnoses and all orders for  this visit:    Synovitis of left knee          I explained my diagnostic impression and the reasoning behind it in detail, using layman's terms.  Models and/or pictures were used to help in the explanation.    Aspiration/injection recommended and consent was    Moderation of activity and icing recommended for 24 hours

## 2024-11-14 ENCOUNTER — OFFICE VISIT (OUTPATIENT)
Dept: FAMILY MEDICINE | Facility: CLINIC | Age: 74
End: 2024-11-14
Payer: MEDICARE

## 2024-11-14 VITALS
HEIGHT: 65 IN | DIASTOLIC BLOOD PRESSURE: 78 MMHG | HEART RATE: 69 BPM | WEIGHT: 245.25 LBS | OXYGEN SATURATION: 98 % | SYSTOLIC BLOOD PRESSURE: 124 MMHG | BODY MASS INDEX: 40.86 KG/M2

## 2024-11-14 DIAGNOSIS — Z23 NEED FOR INFLUENZA VACCINATION: Primary | ICD-10-CM

## 2024-11-14 DIAGNOSIS — R73.03 PREDIABETES: Chronic | ICD-10-CM

## 2024-11-14 DIAGNOSIS — E78.2 MIXED HYPERLIPIDEMIA: Chronic | ICD-10-CM

## 2024-11-14 DIAGNOSIS — Z78.0 POSTMENOPAUSAL: ICD-10-CM

## 2024-11-14 DIAGNOSIS — F32.0 MAJOR DEPRESSIVE DISORDER, SINGLE EPISODE, MILD: Chronic | ICD-10-CM

## 2024-11-14 DIAGNOSIS — E66.01 MORBID OBESITY WITH BODY MASS INDEX OF 40.0-44.9 IN ADULT: Chronic | ICD-10-CM

## 2024-11-14 DIAGNOSIS — I10 PRIMARY HYPERTENSION: Chronic | ICD-10-CM

## 2024-11-14 PROCEDURE — 99999 PR PBB SHADOW E&M-EST. PATIENT-LVL IV: CPT | Mod: PBBFAC,HCNC,, | Performed by: FAMILY MEDICINE

## 2024-11-14 NOTE — PROGRESS NOTES
"PATIENT VISIT FAMILY MEDICINE    CC:   Chief Complaint   Patient presents with    Follow-up     States BP has been running normal        HPI:    Ludmlia reports feeling well with no new health concerns since her last visit. She has implemented lifestyle changes in response to previous medical advice regarding borderline diabetes. For the past 2 weeks, she has incorporated exercise into her routine, using a treadmill and a stationary bike that allows for both arm and leg movement. She has also reduced her intake of sweets, rice, and high-fat foods. A previous issue with her right knee is now improved and less symptomatic after Dr. Mace aspirated fluid from it. Ludmila denies any problems with sleep, bowel habits, or bladder habits, as well as any current issues or chest pain, neck, or mouth area.           MEDS:   Current Outpatient Medications:     aspirin (ECOTRIN) 81 MG EC tablet, Take 81 mg by mouth once daily., Disp: , Rfl:     atorvastatin (LIPITOR) 10 MG tablet, Take 1 tablet (10 mg total) by mouth once daily., Disp: 90 tablet, Rfl: 3    hydroCHLOROthiazide (HYDRODIURIL) 25 MG tablet, Take 1 tablet (25 mg total) by mouth once daily., Disp: 90 tablet, Rfl: 3    metoprolol succinate (TOPROL-XL) 50 MG 24 hr tablet, Take 1 tablet (50 mg total) by mouth once daily., Disp: 90 tablet, Rfl: 3    multivit-min/iron/folic/lutein (CENTRUM SILVER WOMEN ORAL), Take 1 tablet by mouth Daily., Disp: , Rfl:     naproxen (NAPROSYN) 500 MG tablet, TAKE 1 TABLET TWICE DAILY WITH MEALS, Disp: 180 tablet, Rfl: 3  No current facility-administered medications for this visit.    OBJECTIVE:   Vitals:    11/14/24 1009   BP: 124/78   BP Location: Left arm   Patient Position: Sitting   Pulse: 69   SpO2: 98%   Weight: 111.3 kg (245 lb 4.2 oz)   Height: 5' 5" (1.651 m)     Body mass index is 40.81 kg/m².      Physical Exam    General: No acute distress. Well-developed. Well-nourished.  Eyes: EOMI. Sclerae anicteric.  HENT: Normocephalic. " Atraumatic. Nares patent. Moist oral mucosa.  Ears: Bilateral TMs clear. Bilateral EACs clear.  Cardiovascular: Regular rate. Regular rhythm. No murmurs. No rubs. No gallops. Normal S1, S2.  Respiratory: Normal respiratory effort. Clear to auscultation bilaterally. No rales. No rhonchi. No wheezing.  Abdomen: Soft. Non-tender. Non-distended. Normoactive bowel sounds.  Musculoskeletal: No  obvious deformity.  Extremities: No lower extremity edema.  Neurological: Alert & oriented x3. No slurred speech. Normal gait.  Psychiatric: Normal mood. Normal affect. Good insight. Good judgment.  Skin: Warm. Dry. No rash.        LABS:   A1C:  Recent Labs   Lab 05/09/24  1030   Hemoglobin A1C 5.9 H     CBC:  Recent Labs   Lab 05/09/24  1030   WBC 5.03   RBC 4.52   Hemoglobin 12.2   Hematocrit 39.4   Platelets 309   MCV 87   MCH 27.0   MCHC 31.0 L     CMP:  Recent Labs   Lab 05/09/24  1030   Glucose 93   Calcium 9.7   Albumin 3.5   Total Protein 7.4   Sodium 138   Potassium 4.9   CO2 28   Chloride 102   BUN 15   Creatinine 0.8   Alkaline Phosphatase 89   ALT 13   AST 15   Total Bilirubin 0.2     LIPIDS:  Recent Labs   Lab 05/09/24  1030   HDL 58   Cholesterol 175   Triglycerides 133   LDL Cholesterol 90.4   HDL/Cholesterol Ratio 33.1   Non-HDL Cholesterol 117   Total Cholesterol/HDL Ratio 3.0     TSH:          ASSESSMENT & PLAN:    Problem List Items Addressed This Visit          Psychiatric    Major depressive disorder, single episode, mild (Chronic)    Overview     Currently asymptomatic without medication            Cardiac/Vascular    Hypertension (Chronic)    Overview     Well controlled. Continue current regimen           Relevant Orders    CBC Auto Differential    Comprehensive Metabolic Panel    Mixed hyperlipidemia (Chronic)    Overview     Stable. Continue statin         Relevant Orders    Lipid Panel       Endocrine    Morbid obesity with body mass index of 40.0-44.9 in adult (Chronic)    Overview     Body mass index is  40.91 kg/m².    The patient is asked to make an attempt to improve diet and exercise patterns to aid in medical management of this problem.         Prediabetes (Chronic)    Overview     Last A1c is 5.9 on 05/09/2024    The patient is asked to make an attempt to improve diet and exercise patterns to aid in medical management of this problem.           Relevant Orders    Hemoglobin A1C     Other Visit Diagnoses       Need for influenza vaccination    -  Primary    Relevant Medications    influenza (adjuvanted) (Fluad) 45 mcg/0.5 mL IM vaccine (> or = 64 yo) 0.5 mL (Completed)    Postmenopausal        Relevant Orders    DXA Bone Density Axial Skeleton 1 or more sites            Assessment & Plan    Assessed patient's overall health status, noting stable weight and improved blood sugar  Evaluated current exercise regimen and dietary changes, acknowledging positive impact on borderline diabetes management  Determined labs unnecessary at this time due to reported lifestyle improvements and previous reduction in blood sugar      RTC/ED precautions discussed where applicable.   Encouraged patient to let me know if there are any further questions/concerns.     Advise patient/caretaker to check with insurance regarding orders to avoid unexpected fees/costs.     The patient/caretaker indicates understanding of these issues and agrees with the plan.    This note was generated with the assistance of ambient listening technology. Verbal consent was obtained by the patient and accompanying visitor(s) for the recording of patient appointment to facilitate this note. I attest to having reviewed and edited the generated note for accuracy, though some syntax or spelling errors may persist. Please contact the author of this note for any clarification.       Dr. Gay Langford MD  Family Medicine

## 2024-11-19 ENCOUNTER — TELEPHONE (OUTPATIENT)
Dept: FAMILY MEDICINE | Facility: CLINIC | Age: 74
End: 2024-11-19
Payer: MEDICARE

## 2024-11-19 NOTE — TELEPHONE ENCOUNTER
----- Message from Ana Hart NP sent at 11/19/2024  1:01 PM CST -----  Please contact the patient and let them know that their results were fine and do not require any change in treatment. Repeat in 2-3 years.

## 2024-12-02 ENCOUNTER — TELEPHONE (OUTPATIENT)
Dept: FAMILY MEDICINE | Facility: CLINIC | Age: 74
End: 2024-12-02
Payer: MEDICARE

## 2024-12-02 ENCOUNTER — OFFICE VISIT (OUTPATIENT)
Dept: OPTOMETRY | Facility: CLINIC | Age: 74
End: 2024-12-02
Payer: MEDICARE

## 2024-12-02 DIAGNOSIS — H25.13 SENILE NUCLEAR CATARACT, BILATERAL: Primary | ICD-10-CM

## 2024-12-02 DIAGNOSIS — H52.203 HYPEROPIA OF BOTH EYES WITH ASTIGMATISM AND PRESBYOPIA: ICD-10-CM

## 2024-12-02 DIAGNOSIS — H04.123 DRY EYE SYNDROME OF BOTH EYES: ICD-10-CM

## 2024-12-02 DIAGNOSIS — H52.03 HYPEROPIA OF BOTH EYES WITH ASTIGMATISM AND PRESBYOPIA: ICD-10-CM

## 2024-12-02 DIAGNOSIS — Z12.31 VISIT FOR SCREENING MAMMOGRAM: Primary | ICD-10-CM

## 2024-12-02 DIAGNOSIS — I10 PRIMARY HYPERTENSION: Chronic | ICD-10-CM

## 2024-12-02 DIAGNOSIS — H52.4 HYPEROPIA OF BOTH EYES WITH ASTIGMATISM AND PRESBYOPIA: ICD-10-CM

## 2024-12-02 DIAGNOSIS — R73.03 PREDIABETES: Chronic | ICD-10-CM

## 2024-12-02 PROCEDURE — 1101F PT FALLS ASSESS-DOCD LE1/YR: CPT | Mod: HCNC,CPTII,S$GLB, | Performed by: OPTOMETRIST

## 2024-12-02 PROCEDURE — 1126F AMNT PAIN NOTED NONE PRSNT: CPT | Mod: HCNC,CPTII,S$GLB, | Performed by: OPTOMETRIST

## 2024-12-02 PROCEDURE — 99214 OFFICE O/P EST MOD 30 MIN: CPT | Mod: HCNC,S$GLB,, | Performed by: OPTOMETRIST

## 2024-12-02 PROCEDURE — 3044F HG A1C LEVEL LT 7.0%: CPT | Mod: HCNC,CPTII,S$GLB, | Performed by: OPTOMETRIST

## 2024-12-02 PROCEDURE — 1159F MED LIST DOCD IN RCRD: CPT | Mod: HCNC,CPTII,S$GLB, | Performed by: OPTOMETRIST

## 2024-12-02 PROCEDURE — 99999 PR PBB SHADOW E&M-EST. PATIENT-LVL II: CPT | Mod: PBBFAC,HCNC,, | Performed by: OPTOMETRIST

## 2024-12-02 PROCEDURE — 3288F FALL RISK ASSESSMENT DOCD: CPT | Mod: HCNC,CPTII,S$GLB, | Performed by: OPTOMETRIST

## 2024-12-02 NOTE — PROGRESS NOTES
HPI    CC: Presents today for routine eye exam. Pt states no new concerning   vision problems, however, she would like an updated prescription to   purchase ne glasses.    POLO: 6/24/2022 with Dr. Roberts    (-) Changes in vision   (-) Pain  (-) Irritation   (-) Itching   (-) Flashes  (-) Floaters  (+) Glasses wearer  (-) CL wearer  (-) Uses eye gtts    Does patient want a refraction today? yes    (-) Eye injury  (-) Eye surgery   (-)POHx  (-)FOHx    (-)DM  Hemoglobin A1C       Date                     Value               Ref Range             Status                05/03/2023               5.9 (H)             4.0 - 5.6 %           Final                 Last edited by Mary Roberts, OD on 12/2/2024 12:30 PM.            Assessment /Plan     For exam results, see Encounter Report.    Senile nuclear cataract, bilateral    Prediabetes    Primary hypertension    Hyperopia of both eyes with astigmatism and presbyopia    Dry eye syndrome of both eyes      MONITOR. ED PT ON ALL EXAM FINDINGS  OK TO CONTINUE WITH HABITUAL SPECS   OCULAR HEALTH STABLE OD, OS   MODERATE CATS OD>OS; VISUALLY SIGNIFICANT ; REASSESS IN 6 MONTH; MONITOR.   RTC 1 YR//PRN FOR REE/DFE

## 2024-12-02 NOTE — TELEPHONE ENCOUNTER
----- Message from Penelope sent at 12/2/2024  8:27 AM CST -----  Type:  Mammogram    Caller is requesting to schedule their annual mammogram appointment.  Order is not listed in EPIC.  Please enter order and contact patient to schedule.  Name of Caller:Pt  Where would they like the mammogram performed?[facility]  Would the patient rather a call back or a response via [facility]? call  Best Call Back Number:[phone]  Additional Information:

## 2025-02-24 DIAGNOSIS — Z00.00 ENCOUNTER FOR MEDICARE ANNUAL WELLNESS EXAM: ICD-10-CM

## 2025-02-26 ENCOUNTER — TELEPHONE (OUTPATIENT)
Dept: FAMILY MEDICINE | Facility: CLINIC | Age: 75
End: 2025-02-26
Payer: MEDICARE

## 2025-02-26 NOTE — TELEPHONE ENCOUNTER
----- Message from Vaniamicahteressa sent at 2/26/2025  9:52 AM CST -----  Type:  Sooner Apoointment RequestCaller is requesting a sooner appointment.  Caller declined first available appointment listed below.  Caller will not accept being placed on the waitlist and is requesting a message be sent to doctor.Name of Caller:ptWhen is the first available appointment?09/25Symptoms:annualWould the patient rather a call back or a response via [facility]? callAthos Call Back Number:[phone] Additional Information: Pt stats she would like to schedule annual appt. Please verify if she needs annual or enhanced annual. Thank you.

## 2025-02-26 NOTE — TELEPHONE ENCOUNTER
Called pt to see which annual she needed. Pt states the AWV. I informed her what was available and she understood verbally and scheduled.

## 2025-03-10 ENCOUNTER — OFFICE VISIT (OUTPATIENT)
Dept: FAMILY MEDICINE | Facility: CLINIC | Age: 75
End: 2025-03-10
Payer: MEDICARE

## 2025-03-10 VITALS
WEIGHT: 247.13 LBS | SYSTOLIC BLOOD PRESSURE: 130 MMHG | HEIGHT: 65 IN | OXYGEN SATURATION: 98 % | DIASTOLIC BLOOD PRESSURE: 70 MMHG | HEART RATE: 95 BPM | BODY MASS INDEX: 41.18 KG/M2

## 2025-03-10 DIAGNOSIS — F41.9 ANXIETY: ICD-10-CM

## 2025-03-10 DIAGNOSIS — Z00.00 ENCOUNTER FOR PREVENTIVE HEALTH EXAMINATION: Primary | ICD-10-CM

## 2025-03-10 DIAGNOSIS — M54.50 ACUTE MIDLINE LOW BACK PAIN WITHOUT SCIATICA: Chronic | ICD-10-CM

## 2025-03-10 DIAGNOSIS — Z74.09 IMPAIRED FUNCTIONAL MOBILITY, BALANCE, GAIT, AND ENDURANCE: ICD-10-CM

## 2025-03-10 DIAGNOSIS — I10 HYPERTENSION, UNSPECIFIED TYPE: Chronic | ICD-10-CM

## 2025-03-10 DIAGNOSIS — E66.01 MORBID OBESITY WITH BODY MASS INDEX OF 40.0-44.9 IN ADULT: ICD-10-CM

## 2025-03-10 DIAGNOSIS — F33.9 MAJOR DEPRESSION, RECURRENT, CHRONIC: ICD-10-CM

## 2025-03-10 DIAGNOSIS — G89.29 CHRONIC PAIN OF RIGHT KNEE: ICD-10-CM

## 2025-03-10 DIAGNOSIS — M25.561 CHRONIC PAIN OF RIGHT KNEE: ICD-10-CM

## 2025-03-10 DIAGNOSIS — E78.2 MIXED HYPERLIPIDEMIA: Chronic | ICD-10-CM

## 2025-03-10 DIAGNOSIS — R29.898 DECREASED STRENGTH OF LOWER EXTREMITY: ICD-10-CM

## 2025-03-10 DIAGNOSIS — R73.03 PREDIABETES: Chronic | ICD-10-CM

## 2025-03-10 PROCEDURE — 99999 PR PBB SHADOW E&M-EST. PATIENT-LVL IV: CPT | Mod: PBBFAC,HCNC,,

## 2025-03-10 NOTE — PROGRESS NOTES
"  Ludmila Jimenez presented for a  Medicare AWV and comprehensive Health Risk Assessment today. The following components were reviewed and updated:    Medical history  Family History  Social history  Allergies and Current Medications  Health Risk Assessment  Health Maintenance  Care Team         ** See Completed Assessments for Annual Wellness Visit within the encounter summary.**         The following assessments were completed:  Living Situation  CAGE  Depression Screening  Timed Get Up and Go  Whisper Test  Cognitive Function Screening    Nutrition Screening  ADL Screening  PAQ Screening      Opioid documentation for eAWV      Patient does not have a current opioid prescription.        Review for Substance Use Disorders: Patient does not use substance      Current Medications[1]       Vitals:    03/10/25 1358   BP: 130/70   Pulse: 95   SpO2: 98%   Weight: 112.1 kg (247 lb 2.2 oz)   Height: 5' 5" (1.651 m)   PainSc: 0-No pain      Physical Exam  Vitals reviewed.   Constitutional:       General: She is not in acute distress.     Appearance: Normal appearance. She is not ill-appearing.   HENT:      Head: Normocephalic and atraumatic.      Mouth/Throat:      Mouth: Mucous membranes are moist.   Eyes:      General: No scleral icterus.        Right eye: No discharge.         Left eye: No discharge.      Extraocular Movements: Extraocular movements intact.      Conjunctiva/sclera: Conjunctivae normal.      Comments: Wearing glasses    Cardiovascular:      Rate and Rhythm: Normal rate and regular rhythm.      Heart sounds: No murmur heard.  Pulmonary:      Effort: Pulmonary effort is normal. No respiratory distress.      Breath sounds: Normal breath sounds. No wheezing, rhonchi or rales.   Skin:     General: Skin is warm and dry.   Neurological:      Mental Status: She is alert and oriented to person, place, and time.   Psychiatric:         Mood and Affect: Mood normal.         Behavior: Behavior normal. Behavior is " cooperative.         Cognition and Memory: Cognition and memory normal.               Diagnoses and health risks identified today and associated recommendations/orders:    1. Encounter for preventive health examination  Screenings performed, as noted above. Personal preventative testing needs reviewed.     2. Morbid obesity with body mass index of 40.0-44.9 in adult  Work on diet modification and increase in physical activity as tolerated.   Followed by PCP.     3. Major depression, recurrent, chronic  4. Anxiety  Chronic. Stable. Followed by PCP.     5. Hypertension, unspecified type  Chronic. Stable with Hctz, metoprolol. Followed by PCP.     6. Mixed hyperlipidemia  Chronic. Stable with atorvastatin. Followed by PCP.     7. Prediabetes  Work on diet modification and increase in physical activity as tolerated.   Followed by PCP.     8. Acute midline low back pain without sciatica  9. Chronic pain of right knee  Chronic. Stable with naproxen prn. Followed by orthopedics.     10. Decreased strength of lower extremity  11. Impaired functional mobility, balance, gait, and endurance  Chronic. Stable without ambulatory assistive devices. Followed by Orthopedics.       Provided Ludmila with a 5-10 year written screening schedule and personal prevention plan. Recommendations were developed using the USPSTF age appropriate recommendations. Education, counseling, and referrals were provided as needed. After Visit Summary printed and given to patient which includes a list of additional screenings\tests needed.    Follow up in about 1 year (around 3/10/2026) for your next annual wellness visit.    Ana Hrat NP    Advance Care Planning     I offered to discuss advanced care planning, including how to pick a person who would make decisions for you if you were unable to make them for yourself, called a health care power of , and what kind of decisions you might make such as use of life sustaining treatments such as  ventilators and tube feeding when faced with a life limiting illness recorded on a living will that they will need to know. (How you want to be cared for as you near the end of your natural life)     X  Patient is unwilling to engage in a discussion regarding advance directives at this time.         [1]   Current Outpatient Medications:     aspirin (ECOTRIN) 81 MG EC tablet, Take 81 mg by mouth once daily., Disp: , Rfl:     atorvastatin (LIPITOR) 10 MG tablet, Take 1 tablet (10 mg total) by mouth once daily., Disp: 90 tablet, Rfl: 3    hydroCHLOROthiazide (HYDRODIURIL) 25 MG tablet, Take 1 tablet (25 mg total) by mouth once daily., Disp: 90 tablet, Rfl: 3    metoprolol succinate (TOPROL-XL) 50 MG 24 hr tablet, Take 1 tablet (50 mg total) by mouth once daily., Disp: 90 tablet, Rfl: 3    multivit-min/iron/folic/lutein (CENTRUM SILVER WOMEN ORAL), Take 1 tablet by mouth Daily., Disp: , Rfl:     naproxen (NAPROSYN) 500 MG tablet, TAKE 1 TABLET TWICE DAILY WITH MEALS (Patient taking differently: Take 500 mg by mouth 2 (two) times daily as needed (knee pain).), Disp: 180 tablet, Rfl: 3

## 2025-03-10 NOTE — PATIENT INSTRUCTIONS
Counseling and Referral of Other Preventative  (Italic type indicates deductible and co-insurance are waived)    Patient Name: Ludmila Jimenez  Today's Date: 3/10/2025    Health Maintenance       Date Due Completion Date    RSV Vaccine (Age 60+ and Pregnant patients) (1 - 1-dose 75+ series) Never done ---    COVID-19 Vaccine (4 - 2024-25 season) 03/10/2026 (Originally 9/1/2024) 12/3/2021    Hemoglobin A1c (Prediabetes) 05/09/2025 5/9/2024    DEXA Scan 11/19/2027 11/19/2024    Lipid Panel 05/09/2029 5/9/2024    Colonoscopy 02/27/2030 2/27/2020    TETANUS VACCINE 01/03/2033 1/3/2023        No orders of the defined types were placed in this encounter.    The following information is provided to all patients.  This information is to help you find resources for any of the problems found today that may be affecting your health:                  Living healthy guide: www.[facility].[place].[facility]      Understanding Diabetes: www.diabetes.org      Eating healthy: www.cdc.gov/healthyweight      [facility] home safety checklist: www.cdc.gov/steadi/patient.html      Agency on Aging: www.goea.[place].gov      Alcoholics anonymous (AA): www.aa.org      Physical Activity: www.bhavin.nih.gov/co1qddl      Tobacco use: www.quitwithusla.org

## 2025-04-22 DIAGNOSIS — I10 PRIMARY HYPERTENSION: Chronic | ICD-10-CM

## 2025-04-22 RX ORDER — HYDROCHLOROTHIAZIDE 25 MG/1
25 TABLET ORAL DAILY
Qty: 90 TABLET | Refills: 3 | Status: SHIPPED | OUTPATIENT
Start: 2025-04-22

## 2025-04-22 RX ORDER — METOPROLOL SUCCINATE 50 MG/1
50 TABLET, EXTENDED RELEASE ORAL DAILY
Qty: 90 TABLET | Refills: 3 | Status: SHIPPED | OUTPATIENT
Start: 2025-04-22

## 2025-04-22 NOTE — TELEPHONE ENCOUNTER
----- Message from Patrizia sent at 4/22/2025 10:24 AM CDT -----  Type:  RX Refill RequestWho Called: OSWALD Ambrocio [[id number]]Refill or New Rx:refill RX Name and Strength:metoprolol succinate (TOPROL-XL) 50 MG 24 hr tablet   How is the patient currently taking it? (ex. 1XDay):1x day Is this a 30 day or 90 day RX:90Preferred Pharmacy with phone number:[facility] [address] [facility] [address] [facility] [address]Hopto: [phone] Fax: [phone]: Not open 24 hoursWould the patient rather a call back or a response via [facility]? [facility] Call Back Number:[phone] Additional Information: Type:  RX Refill RequestWho Called: OSWALD Shirley [[id number]]Refill or New Rx:refillRX Name and Strength:hydroCHLOROthiazide (HYDRODIURIL) 25 MG tabletHow is the patient currently taking it? (ex. 1XDay):1xdayIs this a 30 day or 90 day RX:90Preferred Pharmacy with phone number:[facility] [address] [facility] [address]Qwmbw: [phone] Fax: [phone]: Not open 24 hoursWould the patient rather a call back or a response via [facility]? Call back [facility] Call Back Number:[phone] Additional Information:

## 2025-04-22 NOTE — TELEPHONE ENCOUNTER
Care Due:                  Date            Visit Type   Department     Provider  --------------------------------------------------------------------------------                                EP -                              PRIMARY      [facility]  Last Visit: 11-      [facility] ([facility]                               -                              PRIMARY      [facility]  Next Visit: 05-      [facility] ([facility])   [facility]  Primitivo                                                            Last  Test          Frequency    Reason                     Performed    Due Date  --------------------------------------------------------------------------------    CMP.........  12 months..  atorvastatin,              05- 05-                             hydroCHLOROthiazide......    Lipid Panel.  12 months..  atorvastatin.............  05- 05-    [facility] Embedded Care Due Messages. Reference number: [id number].   4/22/2025 10:51:47 AM CDT

## 2025-05-21 ENCOUNTER — OFFICE VISIT (OUTPATIENT)
Dept: FAMILY MEDICINE | Facility: CLINIC | Age: 75
End: 2025-05-21
Payer: MEDICARE

## 2025-05-21 VITALS
WEIGHT: 245.94 LBS | OXYGEN SATURATION: 98 % | DIASTOLIC BLOOD PRESSURE: 78 MMHG | BODY MASS INDEX: 40.98 KG/M2 | SYSTOLIC BLOOD PRESSURE: 132 MMHG | HEART RATE: 76 BPM | HEIGHT: 65 IN

## 2025-05-21 DIAGNOSIS — E66.01 MORBID OBESITY WITH BODY MASS INDEX OF 40.0-44.9 IN ADULT: Chronic | ICD-10-CM

## 2025-05-21 DIAGNOSIS — M17.11 PRIMARY OSTEOARTHRITIS OF RIGHT KNEE: ICD-10-CM

## 2025-05-21 DIAGNOSIS — I10 PRIMARY HYPERTENSION: Primary | Chronic | ICD-10-CM

## 2025-05-21 DIAGNOSIS — E78.2 MIXED HYPERLIPIDEMIA: Chronic | ICD-10-CM

## 2025-05-21 DIAGNOSIS — R73.03 PREDIABETES: Chronic | ICD-10-CM

## 2025-05-21 DIAGNOSIS — R39.15 URINARY URGENCY: Chronic | ICD-10-CM

## 2025-05-21 PROCEDURE — 99999 PR PBB SHADOW E&M-EST. PATIENT-LVL III: CPT | Mod: PBBFAC,HCNC,, | Performed by: FAMILY MEDICINE

## 2025-05-21 RX ORDER — HYDROCHLOROTHIAZIDE 25 MG/1
25 TABLET ORAL DAILY
Qty: 90 TABLET | Refills: 3 | Status: SHIPPED | OUTPATIENT
Start: 2025-05-21

## 2025-05-21 RX ORDER — NAPROXEN 500 MG/1
500 TABLET ORAL 2 TIMES DAILY WITH MEALS
Qty: 180 TABLET | Refills: 3 | Status: SHIPPED | OUTPATIENT
Start: 2025-05-21

## 2025-05-21 RX ORDER — METOPROLOL SUCCINATE 50 MG/1
50 TABLET, EXTENDED RELEASE ORAL DAILY
Qty: 90 TABLET | Refills: 3 | Status: SHIPPED | OUTPATIENT
Start: 2025-05-21

## 2025-05-21 RX ORDER — ATORVASTATIN CALCIUM 10 MG/1
10 TABLET, FILM COATED ORAL DAILY
Qty: 90 TABLET | Refills: 3 | Status: SHIPPED | OUTPATIENT
Start: 2025-05-21

## 2025-05-21 NOTE — PROGRESS NOTES
"PATIENT VISIT FAMILY MEDICINE    CC:   Chief Complaint   Patient presents with    Follow-up    Hypertension       HPI:      URINARY SYMPTOMS:  She reports urinary leakage for the past few years with nocturnal urinary urgency occurring once to twice per week. She is usually able to reach the restroom when experiencing urgency.    DIET AND LIFESTYLE:  She drinks one cup of coffee every morning with one sweetener and water late in the evening. She rarely consumes alcohol in social settings.    BLOOD PRESSURE:  Home blood pressure readings are typically in the 120s.    LABS:  A1C was 5.9.    MEDICATIONS:  She receives medications through mail order pharmacy with automatic refills and reports no issues with current medications.          MEDS: Current Medications[1]    OBJECTIVE:   Vitals:    05/21/25 0934   BP: 132/78   BP Location: Left arm   Patient Position: Sitting   Pulse: 76   SpO2: 98%   Weight: 111.5 kg (245 lb 14.8 oz)   Height: 5' 5" (1.651 m)     Body mass index is 40.92 kg/m².      Physical Exam    Constitutional: Normal appearance.  HENT: Normocephalic.  Eyes: No discharge bilaterally. Extraocular movements intact.  Cardiovascular: Normal rate and regular rhythm. Normal heart sounds.  Pulmonary: Pulmonary effort is normal. Normal breath sounds.  Skin: Warm. Dry.  Neurological: Alert.  Psychiatric: Behavior normal.  Extremities: No swelling.          LABS:   A1C:  Recent Labs   Lab 05/14/25  0950   Hemoglobin A1c 5.9 H     CBC:  Recent Labs   Lab 05/14/25  0950   WBC 5.14   RBC 4.52   HGB 12.4   HCT 39.5   Platelet Count 308   MCV 87   MCH 27.4   MCHC 31.4 L     CMP:  Recent Labs   Lab 05/14/25  0950   Glucose 119 H   Calcium 9.0   Albumin 3.3 L   Protein Total 7.5   Sodium 140   Potassium 4.2   CO2 25   Chloride 108   BUN 15   Creatinine 0.8   ALP 92   ALT 11   AST 17   Bilirubin Total 0.4     LIPIDS:  Recent Labs   Lab 05/14/25  0950   HDL Cholesterol 52   Cholesterol Total 154   Triglyceride 97   LDL " Cholesterol 82.6   HDL/Cholesterol Ratio 33.8   Non HDL Cholesterol 102   Cholesterol/HDL Ratio 3.0     TSH:          ASSESSMENT & PLAN:    Problem List Items Addressed This Visit          Cardiac/Vascular    Hypertension - Primary (Chronic)    Overview   Well controlled. Continue hctz, metoprolol           Relevant Medications    hydroCHLOROthiazide (HYDRODIURIL) 25 MG tablet    metoprolol succinate (TOPROL-XL) 50 MG 24 hr tablet    Mixed hyperlipidemia (Chronic)    Overview   Stable. Continue statin         Relevant Medications    atorvastatin (LIPITOR) 10 MG tablet       Renal/    Urinary urgency (Chronic)    Overview   Recommend scheduled bathroom breaks. Declines trial of medication at this time.             Endocrine    Morbid obesity with body mass index of 40.0-44.9 in adult (Chronic)    Overview   Body mass index is 40.92 kg/m².    The patient is asked to make an attempt to improve diet and exercise patterns to aid in medical management of this problem.         Prediabetes (Chronic)    Overview   Last A1c is 5.9 on 05/09/2024    The patient is asked to make an attempt to improve diet and exercise patterns to aid in medical management of this problem.              Orthopedic    Primary osteoarthritis of right knee    Overview   Stable. Continue naproxen         Relevant Medications    naproxen (NAPROSYN) 500 MG tablet       PREDIABETES:  - Monitored the patient's A1C which remained stable at 5.9, below diabetic threshold, indicating good maintenance of their prediabetic condition.  - Explained A1C level, diabetes diagnosis criteria, and potential long-term complications of uncontrolled diabetes.  - Discussed dietary management including importance of portion control for carbohydrates (especially rice, pasta, and bread), benefits of consuming fiber and protein before carbohydrates to help balance glucose and reduce cravings, and advised limiting intake of sweets with consideration of sugar-free alternatives  without overindulgence.    F/u in 6 months for BP      RTC/ED precautions discussed where applicable.   Encouraged patient to let me know if there are any further questions/concerns.     Advise patient/caretaker to check with insurance regarding orders to avoid unexpected fees/costs.     The patient/caretaker indicates understanding of these issues and agrees with the plan.    This note was generated with the assistance of ambient listening technology. I attest to having reviewed and edited the generated note for accuracy, though some syntax or spelling errors may persist. Please contact the author of this note for any clarification.      Dr. Gay Langford MD  Family Medicine         [1]   Current Outpatient Medications:     aspirin (ECOTRIN) 81 MG EC tablet, Take 81 mg by mouth once daily., Disp: , Rfl:     multivit-min/iron/folic/lutein (CENTRUM SILVER WOMEN ORAL), Take 1 tablet by mouth Daily., Disp: , Rfl:     atorvastatin (LIPITOR) 10 MG tablet, Take 1 tablet (10 mg total) by mouth once daily., Disp: 90 tablet, Rfl: 3    hydroCHLOROthiazide (HYDRODIURIL) 25 MG tablet, Take 1 tablet (25 mg total) by mouth once daily., Disp: 90 tablet, Rfl: 3    metoprolol succinate (TOPROL-XL) 50 MG 24 hr tablet, Take 1 tablet (50 mg total) by mouth once daily., Disp: 90 tablet, Rfl: 3    naproxen (NAPROSYN) 500 MG tablet, Take 1 tablet (500 mg total) by mouth 2 (two) times daily with meals., Disp: 180 tablet, Rfl: 3

## 2025-06-02 ENCOUNTER — TELEPHONE (OUTPATIENT)
Dept: PHARMACY | Facility: CLINIC | Age: 75
End: 2025-06-02
Payer: MEDICARE